# Patient Record
Sex: MALE | Race: BLACK OR AFRICAN AMERICAN | Employment: OTHER | URBAN - METROPOLITAN AREA
[De-identification: names, ages, dates, MRNs, and addresses within clinical notes are randomized per-mention and may not be internally consistent; named-entity substitution may affect disease eponyms.]

---

## 2017-01-13 ENCOUNTER — OFFICE VISIT (OUTPATIENT)
Dept: FAMILY MEDICINE | Facility: CLINIC | Age: 57
End: 2017-01-13
Payer: COMMERCIAL

## 2017-01-13 VITALS
OXYGEN SATURATION: 98 % | SYSTOLIC BLOOD PRESSURE: 112 MMHG | WEIGHT: 185.4 LBS | RESPIRATION RATE: 20 BRPM | DIASTOLIC BLOOD PRESSURE: 64 MMHG | TEMPERATURE: 97 F | HEART RATE: 86 BPM | HEIGHT: 71 IN | BODY MASS INDEX: 25.96 KG/M2

## 2017-01-13 DIAGNOSIS — E78.5 HYPERLIPIDEMIA LDL GOAL <130: ICD-10-CM

## 2017-01-13 DIAGNOSIS — E55.9 VITAMIN D INSUFFICIENCY: ICD-10-CM

## 2017-01-13 DIAGNOSIS — I10 HYPERTENSION GOAL BP (BLOOD PRESSURE) < 140/80: Chronic | ICD-10-CM

## 2017-01-13 DIAGNOSIS — J06.9 VIRAL UPPER RESPIRATORY TRACT INFECTION: Primary | ICD-10-CM

## 2017-01-13 PROCEDURE — 99213 OFFICE O/P EST LOW 20 MIN: CPT | Performed by: FAMILY MEDICINE

## 2017-01-13 RX ORDER — IPRATROPIUM BROMIDE 42 UG/1
2 SPRAY, METERED NASAL 4 TIMES DAILY PRN
Qty: 1 BOX | Refills: 3 | Status: SHIPPED | OUTPATIENT
Start: 2017-01-13 | End: 2018-05-03

## 2017-01-13 RX ORDER — PREDNISONE 10 MG/1
10 TABLET ORAL DAILY
Qty: 7 TABLET | Refills: 0 | Status: SHIPPED | OUTPATIENT
Start: 2017-01-13 | End: 2018-05-03

## 2017-01-13 RX ORDER — ALBUTEROL SULFATE 90 UG/1
2 AEROSOL, METERED RESPIRATORY (INHALATION) EVERY 6 HOURS PRN
Qty: 1 INHALER | Refills: 0 | Status: SHIPPED | OUTPATIENT
Start: 2017-01-13 | End: 2018-05-03

## 2017-01-13 RX ORDER — IPRATROPIUM BROMIDE 42 UG/1
2 SPRAY, METERED NASAL 4 TIMES DAILY PRN
Qty: 3 BOX | Refills: 0 | Status: SHIPPED | OUTPATIENT
Start: 2017-01-13 | End: 2017-01-13

## 2017-01-13 RX ORDER — INDAPAMIDE 2.5 MG/1
2.5 TABLET ORAL DAILY
Qty: 90 TABLET | Refills: 3 | Status: SHIPPED | OUTPATIENT
Start: 2017-01-13 | End: 2018-02-25

## 2017-01-13 NOTE — PATIENT INSTRUCTIONS
(J06.9,  B97.89) Viral upper respiratory tract infection  (primary encounter diagnosis)    Comment:      Plan: ipratropium (ATROVENT) 0.06 % spray, albuterol           (PROAIR HFA/PROVENT    IL HFA/VENTOLIN HFA) 108 (90          BASE) MCG/ACT Inhaler,     predniSONE (DELTASONE)           10 MG tablet, DISCONTINUED: ipratropium           (ATROVENT) 0.06 % spray    Kerwin Fleming Jr., MD
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
## 2017-01-13 NOTE — MR AVS SNAPSHOT
"              After Visit Summary   1/13/2017    Jaren Carmona    MRN: [id number]           Patient Information     Date Of Birth          1960        Visit Information        Provider Department      1/13/2017 1:30 PM Kerwin Fleming MD [facility]        Today's Diagnoses     Viral upper respiratory tract infection    -  1       Care Instructions      (J06.9,  B97.89) Viral upper respiratory tract infection  (primary encounter diagnosis)    Comment:      Plan: ipratropium (ATROVENT) 0.06 % spray, albuterol           (PROAIR HFA/PROVENT    IL HFA/VENTOLIN HFA) 108 (90          BASE) MCG/ACT Inhaler,     predniSONE (DELTASONE)           10 MG tablet, DISCONTINUED: ipratropium           (ATROVENT) 0.06 % spray    Kerwin Fleming Jr., MD                      Follow-ups after your visit        Who to contact     If you have questions or need follow up information about today's clinic visit or your schedule please contact [facility] directly at [phone].  Normal or non-critical lab and imaging results will be communicated to you by Vhotohart, letter or phone within 4 business days after the clinic has received the results. If you do not hear from us within 7 days, please contact the clinic through [employer] or phone. If you have a critical or abnormal lab result, we will notify you by phone as soon as possible.  Submit refill requests through [employer] or call your pharmacy and they will forward the refill request to us. Please allow 3 business days for your refill to be completed.          Additional Information About Your Visit        MyChart Information     [employer] lets you send messages to your doctor, view your test results, renew your prescriptions, schedule appointments and more. To sign up, go to [email or web address] . Click on \"Log in\" on the left side of the screen, which will take you to the Welcome page. Then click on " "\"Sign up Now\" on the right side of the page.     You will be asked to enter the access code listed below, as well as some personal information. Please follow the directions to create your username and password.     Your access code is: [email or web address]  Expires: 3/30/2017  8:32 AM     Your access code will  in 90 days. If you need help or a new code, please call your [facility] clinic or [phone].        Care EveryWhere ID     This is your Care EveryWhere ID. This could be used by other organizations to access your [facility] medical records  [id number]        Your Vitals Were     Pulse Temperature Respirations Height BMI (Body Mass Index) Pulse Oximetry    86 97  F (36.1  C) (Tympanic) 20 5' 10.5\" (1.791 m) 26.22 kg/m2 98%       Blood Pressure from Last 3 Encounters:   17 112/64   16 112/72   16 126/74    Weight from Last 3 Encounters:   17 185 lb 6.4 oz (84.097 kg)   16 185 lb (83.915 kg)   16 181 lb 12.8 oz (82.464 kg)              Today, you had the following     No orders found for display         Today's Medication Changes          These changes are accurate as of: 17  2:06 PM.  If you have any questions, ask your nurse or doctor.               Start taking these medicines.        Dose/Directions    albuterol 108 (90 BASE) MCG/ACT Inhaler   Commonly known as:  PROAIR HFA/PROVENTIL HFA/VENTOLIN HFA   Used for:  Viral upper respiratory tract infection   Started by:  Kerwin Fleming MD        Dose:  2 puff   Inhale 2 puffs into the lungs every 6 hours as needed for shortness of breath / dyspnea or wheezing   Quantity:  1 Inhaler   Refills:  0       ipratropium 0.06 % spray   Commonly known as:  ATROVENT   Used for:  Viral upper respiratory tract infection   Started by:  Kerwin Fleming MD        Dose:  2 spray   Spray 2 sprays into both nostrils 4 times daily as needed for rhinitis   Quantity:  1 Box   Refills:  3       predniSONE 10 MG tablet "   Commonly known as:  DELTASONE   Used for:  Viral upper respiratory tract infection   Started by:  Kerwin Fleming MD        Dose:  10 mg   Take 1 tablet (10 mg) by mouth daily   Quantity:  7 tablet   Refills:  0            Where to get your medicines      These medications were sent to [email or web address] - [place], [place] - [address] AT [facility] [address], [facility] [address]    Hours:  24-hours Phone:  [phone]    - albuterol 108 (90 BASE) MCG/ACT Inhaler  - ipratropium 0.06 % spray  - predniSONE 10 MG tablet             Primary Care Provider Office Phone # Fax #    Kerwin Fleming [phone]       [facility] [address]  [facility] [address]        Thank you!     Thank you for choosing [facility]  for your care. Our goal is always to provide you with excellent care. Hearing back from our patients is one way we can continue to improve our services. Please take a few minutes to complete the written survey that you may receive in the mail after your visit with us. Thank you!             Your Updated Medication List - Protect others around you: Learn how to safely use, store and throw away your medicines at www.disposemymeds.org.          This list is accurate as of: 1/13/17  2:06 PM.  Always use your most recent med list.                   Brand Name Dispense Instructions for use    albuterol 108 (90 BASE) MCG/ACT Inhaler    PROAIR HFA/PROVENTIL HFA/VENTOLIN HFA    1 Inhaler    Inhale 2 puffs into the lungs every 6 hours as needed for shortness of breath / dyspnea or wheezing       ALPRAZolam 0.25 MG tablet    XANAX    20 tablet    Take 1 tablet (0.25 mg) by mouth 3 times daily as needed for anxiety       diclofenac 1 % Gel topical gel    VOLTAREN    100 g    Apply 4 grams to knees or 2 grams to hands four times daily using enclosed dosing card.       fluticasone 50 MCG/ACT spray     FLONASE    48 mL    SHAKE LIQUID AND USE 1 TO 2 SPRAYS IN EACH NOSTRIL DAILY       indapamide 2.5 MG tablet    LOZOL    90 tablet    TAKE 1 TABLET BY MOUTH DAILY       ipratropium 0.06 % spray    ATROVENT    1 Box    Spray 2 sprays into both nostrils 4 times daily as needed for rhinitis       olopatadine HCl 0.2 % Soln    PATADAY    1 Bottle    Place 1 drop into both eyes as needed       predniSONE 10 MG tablet    DELTASONE    7 tablet    Take 1 tablet (10 mg) by mouth daily       sildenafil 20 MG tablet    REVATIO/VIAGRA    100 tablet    Take 1 tablet (20 mg) by mouth 3 times daily For pulmonary hypertension.  Never use with nitroglycerin, terazosin or doxazosin.

## 2017-01-13 NOTE — NURSING NOTE
"Chief Complaint   Patient presents with     Cough       Initial /64 mmHg  Pulse 86  Temp(Src) 97  F (36.1  C) (Tympanic)  Resp 20  Ht 5' 10.5\" (1.791 m)  Wt 185 lb 6.4 oz (84.097 kg)  BMI 26.22 kg/m2  SpO2 98% Estimated body mass index is 26.22 kg/(m^2) as calculated from the following:    Height as of this encounter: 5' 10.5\" (1.791 m).    Weight as of this encounter: 185 lb 6.4 oz (84.097 kg).  BP completed using cuff size: gogo Kenyon CMA      "

## 2017-06-29 ENCOUNTER — TELEPHONE (OUTPATIENT)
Dept: FAMILY MEDICINE | Facility: CLINIC | Age: 57
End: 2017-06-29

## 2017-06-29 NOTE — LETTER
[facility]  [address]  [address]  [facility] [place] [id number] [phone]                                                                                                           Jaren Carmona  [address]  [place] [place] [address]    June 29, 2017      Dear Mr. Mahmoodcr,     It has come to my attention that you have not had a  FIT test kit (testing for hidden blood in the stool) completed since 2013.     I care about your health. Colon cancer is on the rise and the FIT test for blood in the stool is the best test to detect this. This takes only one stool specimen and no diet restrictions prior to collection. This is an annual test and kit can be obtained at the lab department. Thank you.     If you have any questions, please feel free to contact us at  [phone]        Sincerely,    Kerwin Fleming Jr MD

## 2017-06-29 NOTE — TELEPHONE ENCOUNTER
Panel Management Review      Patient has the following on his problem list:     Hypertension   Last three blood pressure readings:  BP Readings from Last 3 Encounters:   01/13/17 112/64   12/30/16 112/72   07/26/16 126/74     Blood pressure: Passed    HTN Guidelines:  Age 18-59 BP range:  Less than 140/90  Age 60-85 with Diabetes:  Less than 140/90  Age 60-85 without Diabetes:  less than 150/90      Composite cancer screening  Chart review shows that this patient is due/due soon for the following Fecal Colorectal (FIT)  Summary:    Patient is due/failing the following:   FIT    Action needed:   Needs to  FIT    Type of outreach:    Sent letter.    Questions for provider review:    None                                                                                                                                    Yessenia Kenyon CMA

## 2017-12-07 ENCOUNTER — TELEPHONE (OUTPATIENT)
Dept: FAMILY MEDICINE | Facility: CLINIC | Age: 57
End: 2017-12-07

## 2017-12-07 NOTE — TELEPHONE ENCOUNTER
12/7/2017      Patient is aware of preventative health screenings and will call back on their own time to schedule.              Outreach ,  Sofía Manrique

## 2018-02-25 DIAGNOSIS — I10 HYPERTENSION GOAL BP (BLOOD PRESSURE) < 140/80: Chronic | ICD-10-CM

## 2018-02-27 NOTE — TELEPHONE ENCOUNTER
"Requested Prescriptions   Pending Prescriptions Disp Refills     indapamide (LOZOL) 2.5 MG tablet [Pharmacy Med Name: INDAPAMIDE 2.5MG TABLETS] 90 tablet 0    Last Written Prescription Date:  1/13/17  Last Fill Quantity: 90,  # refills: 3   Last office visit: 1/13/2017 with prescribing provider:     Future Office Visit:       Sig: TAKE 1 TABLET(2.5 MG) BY MOUTH DAILY    Diuretics (Including Combos) Protocol Failed    2/25/2018  3:14 PM       Failed - Recent or future visit with authorizing provider's specialty    Patient had office visit in the last year or has a visit in the next 30 days with authorizing provider.  See \"Patient Info\" tab in inbasket, or \"Choose Columns\" in Meds & Orders section of the refill encounter.            Failed - Normal serum creatinine on file in past 12 months    Recent Labs   Lab Test  12/08/15   1601   CR  1.30*             Failed - Normal serum potassium on file in past 12 months    Recent Labs   Lab Test  12/08/15   1601   POTASSIUM  3.8                   Failed - Normal serum sodium on file in past 12 months    Recent Labs   Lab Test  12/08/15   1601   NA  141             Passed - Blood pressure under 140/90 in past 12 months    BP Readings from Last 3 Encounters:   01/13/17 112/64   12/30/16 112/72   07/26/16 126/74                Passed - Patient is age 18 or older          "

## 2018-02-27 NOTE — TELEPHONE ENCOUNTER
Routing refill request to provider for review/approval because:  Patient needs to be seen because it has been more than 1 year since last office visit. [place]  He will call for an appointment when he returns.  Patient states he has enough medication for now.  Will refuse this request.

## 2018-03-02 RX ORDER — INDAPAMIDE 2.5 MG/1
TABLET ORAL
Qty: 30 TABLET | Refills: 0 | Status: SHIPPED | OUTPATIENT
Start: 2018-03-02 | End: 2018-05-03

## 2018-05-03 ENCOUNTER — OFFICE VISIT (OUTPATIENT)
Dept: FAMILY MEDICINE | Facility: CLINIC | Age: 58
End: 2018-05-03

## 2018-05-03 VITALS
BODY MASS INDEX: 23.62 KG/M2 | DIASTOLIC BLOOD PRESSURE: 74 MMHG | TEMPERATURE: 97.7 F | RESPIRATION RATE: 16 BRPM | HEART RATE: 90 BPM | OXYGEN SATURATION: 97 % | HEIGHT: 70 IN | SYSTOLIC BLOOD PRESSURE: 118 MMHG | WEIGHT: 165 LBS

## 2018-05-03 DIAGNOSIS — Z11.59 NEED FOR HEPATITIS C SCREENING TEST: ICD-10-CM

## 2018-05-03 DIAGNOSIS — R01.1 SYSTOLIC MURMUR: ICD-10-CM

## 2018-05-03 DIAGNOSIS — E78.5 HYPERLIPIDEMIA LDL GOAL <130: ICD-10-CM

## 2018-05-03 DIAGNOSIS — I10 HYPERTENSION GOAL BP (BLOOD PRESSURE) < 140/80: Chronic | ICD-10-CM

## 2018-05-03 DIAGNOSIS — J06.9 VIRAL UPPER RESPIRATORY TRACT INFECTION: ICD-10-CM

## 2018-05-03 DIAGNOSIS — Z12.11 SCREEN FOR COLON CANCER: ICD-10-CM

## 2018-05-03 DIAGNOSIS — M54.2 NECK PAIN: ICD-10-CM

## 2018-05-03 DIAGNOSIS — J30.2 CHRONIC SEASONAL ALLERGIC RHINITIS, UNSPECIFIED TRIGGER: ICD-10-CM

## 2018-05-03 DIAGNOSIS — Z00.00 ROUTINE GENERAL MEDICAL EXAMINATION AT A HEALTH CARE FACILITY: Primary | ICD-10-CM

## 2018-05-03 DIAGNOSIS — G89.29 CHRONIC MIDLINE LOW BACK PAIN WITHOUT SCIATICA: ICD-10-CM

## 2018-05-03 DIAGNOSIS — S19.9XXD NECK INJURY, SUBSEQUENT ENCOUNTER: ICD-10-CM

## 2018-05-03 DIAGNOSIS — H10.10 SEASONAL ALLERGIC CONJUNCTIVITIS: ICD-10-CM

## 2018-05-03 DIAGNOSIS — M54.50 CHRONIC MIDLINE LOW BACK PAIN WITHOUT SCIATICA: ICD-10-CM

## 2018-05-03 DIAGNOSIS — R37 SEXUAL DYSFUNCTION: ICD-10-CM

## 2018-05-03 DIAGNOSIS — E55.9 VITAMIN D INSUFFICIENCY: ICD-10-CM

## 2018-05-03 DIAGNOSIS — Z12.5 SPECIAL SCREENING FOR MALIGNANT NEOPLASM OF PROSTATE: ICD-10-CM

## 2018-05-03 PROCEDURE — 99396 PREV VISIT EST AGE 40-64: CPT | Performed by: FAMILY MEDICINE

## 2018-05-03 RX ORDER — SILDENAFIL CITRATE 20 MG/1
20 TABLET ORAL 3 TIMES DAILY
Qty: 100 TABLET | Refills: 11 | Status: SHIPPED | OUTPATIENT
Start: 2018-05-03 | End: 2019-11-25

## 2018-05-03 RX ORDER — INDAPAMIDE 2.5 MG/1
2.5 TABLET ORAL DAILY
Qty: 90 TABLET | Refills: 3 | Status: SHIPPED | OUTPATIENT
Start: 2018-05-03 | End: 2018-07-30

## 2018-05-03 RX ORDER — FLUTICASONE PROPIONATE 50 MCG
SPRAY, SUSPENSION (ML) NASAL
Qty: 48 ML | Refills: 3 | Status: SHIPPED | OUTPATIENT
Start: 2018-05-03 | End: 2020-04-17

## 2018-05-03 RX ORDER — PREDNISONE 10 MG/1
10 TABLET ORAL DAILY
Qty: 7 TABLET | Refills: 0 | Status: SHIPPED | OUTPATIENT
Start: 2018-05-03 | End: 2019-11-25

## 2018-05-03 RX ORDER — ALBUTEROL SULFATE 90 UG/1
2 AEROSOL, METERED RESPIRATORY (INHALATION) EVERY 6 HOURS PRN
Qty: 1 INHALER | Refills: 0 | Status: SHIPPED | OUTPATIENT
Start: 2018-05-03 | End: 2020-04-17

## 2018-05-03 RX ORDER — IPRATROPIUM BROMIDE 42 UG/1
2 SPRAY, METERED NASAL 4 TIMES DAILY PRN
Qty: 1 BOX | Refills: 3 | Status: SHIPPED | OUTPATIENT
Start: 2018-05-03 | End: 2020-04-17

## 2018-05-03 NOTE — Clinical Note
Please abstract the following data from this visit with this patient into the appropriate field in Epic:  Colonoscopy done on this date: 1/2016 (approximately), by this group: [facility], results were normal.
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
## 2018-05-03 NOTE — PATIENT INSTRUCTIONS
(Z00.00) Routine general medical examination at a health care facility  (primary encounter diagnosis)  Comment:    Plan:      (Z12.11) Screen for colon cancer  Comment:    Plan:      (Z11.59) Need for hepatitis C screening test  Comment:    Plan:      (J06.9,  B97.89) Viral upper respiratory tract infection  Comment:    Plan: albuterol (PROAIR HFA/PROVENTIL HFA/VENTOLIN         HFA) 108 (90 Base) MCG/ACT Inhaler, predniSONE         (DELTASONE) 10 MG tablet, ipratropium         (ATROVENT) 0.06 % spray                  (J30.2) Chronic seasonal allergic rhinitis, unspecified trigger  Comment:    Plan: fluticasone (FLONASE) 50 MCG/ACT spray             (S19.9XXD) Neck injury, subsequent encounter  Comment:    Plan:      (R37) Sexual dysfunction  Comment:    Plan: sildenafil (REVATIO) 20 MG tablet             (H10.45) Seasonal allergic conjunctivitis  Comment:    Plan:      (I10) Hypertension goal BP (blood pressure) < 140/80  Comment:    Plan: indapamide (LOZOL) 2.5 MG tablet, Basic         metabolic panel             (M54.5,  G89.29) Chronic midline low back pain without sciatica  Comment:    Plan:      (M54.2) Neck pain  Comment:    Plan:      (E55.9) Vitamin D insufficiency  Comment:    Plan:      (R01.1) Systolic murmur  Comment:    Plan:      (E78.5) Hyperlipidemia LDL goal <130  Comment:    Plan:      (Z12.5) Special screening for malignant neoplasm of prostate  Comment:    Plan: Prostate spec antigen screen

## 2018-05-03 NOTE — MR AVS SNAPSHOT
After Visit Summary   5/3/2018    Jaren Carmona    MRN: [id number]           Patient Information     Date Of Birth          1960        Visit Information        Provider Department      5/3/2018 3:00 PM Kerwin Fleming MD [facility]        Today's Diagnoses     Routine general medical examination at a health care facility    -  1    Screen for colon cancer        Need for hepatitis C screening test        Viral upper respiratory tract infection        Chronic seasonal allergic rhinitis, unspecified trigger        Neck injury, subsequent encounter        Sexual dysfunction        Seasonal allergic conjunctivitis        Hypertension goal BP (blood pressure) < 140/80        Chronic midline low back pain without sciatica        Neck pain        Vitamin D insufficiency        Systolic murmur        Hyperlipidemia LDL goal <130        Special screening for malignant neoplasm of prostate          Care Instructions    (Z00.00) Routine general medical examination at a health care facility  (primary encounter diagnosis)  Comment:    Plan:      (Z12.11) Screen for colon cancer  Comment:    Plan:      (Z11.59) Need for hepatitis C screening test  Comment:    Plan:      (J06.9,  B97.89) Viral upper respiratory tract infection  Comment:    Plan: albuterol (PROAIR HFA/PROVENTIL HFA/VENTOLIN         HFA) 108 (90 Base) MCG/ACT Inhaler, predniSONE         (DELTASONE) 10 MG tablet, ipratropium         (ATROVENT) 0.06 % spray                  (J30.2) Chronic seasonal allergic rhinitis, unspecified trigger  Comment:    Plan: fluticasone (FLONASE) 50 MCG/ACT spray             (S19.9XXD) Neck injury, subsequent encounter  Comment:    Plan:      (R37) Sexual dysfunction  Comment:    Plan: sildenafil (REVATIO) 20 MG tablet             (H10.45) Seasonal allergic conjunctivitis  Comment:    Plan:      (I10) Hypertension goal BP (blood pressure) < 140/80  Comment:    Plan:  "indapamide (LOZOL) 2.5 MG tablet, Basic         metabolic panel             (M54.5,  G89.29) Chronic midline low back pain without sciatica  Comment:    Plan:      (M54.2) Neck pain  Comment:    Plan:      (E55.9) Vitamin D insufficiency  Comment:    Plan:      (R01.1) Systolic murmur  Comment:    Plan:      (E78.5) Hyperlipidemia LDL goal <130  Comment:    Plan:      (Z12.5) Special screening for malignant neoplasm of prostate  Comment:    Plan: Prostate spec antigen screen                       Follow-ups after your visit        Future tests that were ordered for you today     Open Standing Orders        Priority Remaining Interval Expires Ordered    Basic metabolic panel Routine 3/3 6-12 months 5/3/2018 5/3/2018    Prostate spec antigen screen Routine 3/3 6-12 months 5/3/2018 5/3/2018            Who to contact     If you have questions or need follow up information about today's clinic visit or your schedule please contact [facility] directly at [phone].  Normal or non-critical lab and imaging results will be communicated to you by Sarnovahart, letter or phone within 4 business days after the clinic has received the results. If you do not hear from us within 7 days, please contact the clinic through Sarnovahart or phone. If you have a critical or abnormal lab result, we will notify you by phone as soon as possible.  Submit refill requests through [employer] or call your pharmacy and they will forward the refill request to us. Please allow 3 business days for your refill to be completed.          Additional Information About Your Visit        [employer] Information     [employer] lets you send messages to your doctor, view your test results, renew your prescriptions, schedule appointments and more. To sign up, go to [email or web address] . Click on \"Log in\" on the left side of the screen, which will take you to the Welcome page. Then click on \"Sign up Now\" on the right side of the page. " "    You will be asked to enter the access code listed below, as well as some personal information. Please follow the directions to create your username and password.     Your access code is: [email or web address]  Expires: 2018  4:01 PM     Your access code will  in 90 days. If you need help or a new code, please call your [facility] clinic or [phone].        Care EveryWhere ID     This is your Care EveryWhere ID. This could be used by other organizations to access your [facility] medical records  [id number]        Your Vitals Were     Pulse Temperature Respirations Height Pulse Oximetry BMI (Body Mass Index)    90 97.7  F (36.5  C) (Tympanic) 16 5' 10\" (1.778 m) 97% 23.68 kg/m2       Blood Pressure from Last 3 Encounters:   18 118/74   17 112/64   16 112/72    Weight from Last 3 Encounters:   18 165 lb (74.8 kg)   17 185 lb 6.4 oz (84.1 kg)   16 185 lb (83.9 kg)                 Today's Medication Changes          These changes are accurate as of 5/3/18  4:01 PM.  If you have any questions, ask your nurse or doctor.               These medicines have changed or have updated prescriptions.        Dose/Directions    fluticasone 50 MCG/ACT spray   Commonly known as:  FLONASE   This may have changed:  See the new instructions.   Used for:  Chronic seasonal allergic rhinitis, unspecified trigger   Changed by:  Kerwin Fleming MD        SHAKE LIQUID AND USE 1 TO 2 SPRAYS IN EACH NOSTRIL DAILY   Quantity:  48 mL   Refills:  3       indapamide 2.5 MG tablet   Commonly known as:  LOZOL   This may have changed:  See the new instructions.   Used for:  Hypertension goal BP (blood pressure) < 140/80   Changed by:  Kerwin Fleming MD        Dose:  2.5 mg   Take 1 tablet (2.5 mg) by mouth daily   Quantity:  90 tablet   Refills:  3            Where to get your medicines      These medications were sent to [facility] [address] [place] [address] [facility] [address] " AT [facility] [place] [facility] [place]  [address], [facility] [address]     Phone:  [phone]     predniSONE 10 MG tablet         Some of these will need a paper prescription and others can be bought over the counter.  Ask your nurse if you have questions.     Bring a paper prescription for each of these medications     albuterol 108 (90 Base) MCG/ACT Inhaler    fluticasone 50 MCG/ACT spray    indapamide 2.5 MG tablet    ipratropium 0.06 % spray    sildenafil 20 MG tablet                Primary Care Provider Office Phone # Fax #    Kerwin Mary Lou Fleming [phone] [address]  [facility] [id number]        Equal Access to Services     CLAY GARCIA : Hadii aad ku hadasho Sohangali, waaxda luqadaha, qaybta kaalmada adeegyada, waxay aman montano. So [facility] [phone].    ATENCIÓN: Si habla español, tiene a reyes disposición servicios gratuitos de asistencia lingüística. [facility] [phone].    We comply with applicable federal civil rights laws and [place] laws. We do not discriminate on the basis of race, color, national origin, age, disability, sex, sexual orientation, or gender identity.            Thank you!     Thank you for choosing [facility]  for your care. Our goal is always to provide you with excellent care. Hearing back from our patients is one way we can continue to improve our services. Please take a few minutes to complete the written survey that you may receive in the mail after your visit with us. Thank you!             Your Updated Medication List - Protect others around you: Learn how to safely use, store and throw away your medicines at www.disposemymeds.org.          This list is accurate as of 5/3/18  4:01 PM.  Always use your most recent med list.                   Brand Name Dispense Instructions for use Diagnosis    albuterol 108 (90 Base) MCG/ACT Inhaler    PROAIR HFA/PROVENTIL HFA/VENTOLIN  HFA    1 Inhaler    Inhale 2 puffs into the lungs every 6 hours as needed for shortness of breath / dyspnea or wheezing    Viral upper respiratory tract infection       diclofenac 1 % Gel topical gel    VOLTAREN    100 g    Apply 4 grams to knees or 2 grams to hands four times daily using enclosed dosing card.    Neck injury, subsequent encounter       fluticasone 50 MCG/ACT spray    FLONASE    48 mL    SHAKE LIQUID AND USE 1 TO 2 SPRAYS IN EACH NOSTRIL DAILY    Chronic seasonal allergic rhinitis, unspecified trigger       indapamide 2.5 MG tablet    LOZOL    90 tablet    Take 1 tablet (2.5 mg) by mouth daily    Hypertension goal BP (blood pressure) < 140/80       ipratropium 0.06 % spray    ATROVENT    1 Box    Spray 2 sprays into both nostrils 4 times daily as needed for rhinitis    Viral upper respiratory tract infection       olopatadine HCl 0.2 % Soln    PATADAY    1 Bottle    Place 1 drop into both eyes as needed    Seasonal allergic conjunctivitis       predniSONE 10 MG tablet    DELTASONE    7 tablet    Take 1 tablet (10 mg) by mouth daily    Viral upper respiratory tract infection       sildenafil 20 MG tablet    REVATIO    100 tablet    Take 1 tablet (20 mg) by mouth 3 times daily For pulmonary hypertension.  Never use with nitroglycerin, terazosin or doxazosin.    Sexual dysfunction

## 2018-07-16 ENCOUNTER — HOSPITAL ENCOUNTER (EMERGENCY)
Facility: CLINIC | Age: 58
Discharge: HOME | End: 2018-07-17
Attending: EMERGENCY MEDICINE | Admitting: EMERGENCY MEDICINE
Payer: COMMERCIAL

## 2018-07-16 ENCOUNTER — APPOINTMENT (OUTPATIENT)
Dept: CT IMAGING | Facility: CLINIC | Age: 58
End: 2018-07-16
Attending: EMERGENCY MEDICINE
Payer: COMMERCIAL

## 2018-07-16 DIAGNOSIS — S16.1XXA STRAIN OF NECK MUSCLE, INITIAL ENCOUNTER: ICD-10-CM

## 2018-07-16 DIAGNOSIS — S09.90XA CLOSED HEAD INJURY, INITIAL ENCOUNTER: ICD-10-CM

## 2018-07-16 PROCEDURE — 99284 EMERGENCY DEPT VISIT MOD MDM: CPT | Mod: 25

## 2018-07-16 PROCEDURE — 70450 CT HEAD/BRAIN W/O DYE: CPT

## 2018-07-16 NOTE — ED AVS SNAPSHOT
Emergency Department    [address] [place] [address]    Phone:  [phone]    Fax:  [phone]                                       Jaren Carmona   MRN: [id number]    Department:   Emergency Department   Date of Visit:  7/16/2018           After Visit Summary Signature Page     I have received my discharge instructions, and my questions have been answered. I have discussed any challenges I see with this plan with the nurse or doctor.    ..........................................................................................................................................  Patient/Patient Representative Signature      ..........................................................................................................................................  Patient Representative Print Name and Relationship to Patient    ..................................................               ................................................  Date                                            Time    ..........................................................................................................................................  Reviewed by Signature/Title    ...................................................              ..............................................  Date                                                            Time
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
## 2018-07-17 ENCOUNTER — APPOINTMENT (OUTPATIENT)
Dept: GENERAL RADIOLOGY | Facility: CLINIC | Age: 58
End: 2018-07-17
Attending: EMERGENCY MEDICINE
Payer: COMMERCIAL

## 2018-07-17 VITALS
OXYGEN SATURATION: 99 % | HEART RATE: 73 BPM | DIASTOLIC BLOOD PRESSURE: 84 MMHG | RESPIRATION RATE: 18 BRPM | HEIGHT: 72 IN | WEIGHT: 174 LBS | TEMPERATURE: 98.2 F | BODY MASS INDEX: 23.57 KG/M2 | SYSTOLIC BLOOD PRESSURE: 162 MMHG

## 2018-07-17 PROCEDURE — 72040 X-RAY EXAM NECK SPINE 2-3 VW: CPT

## 2018-07-17 RX ORDER — CYCLOBENZAPRINE HCL 10 MG
10 TABLET ORAL 3 TIMES DAILY PRN
Qty: 15 TABLET | Refills: 0 | Status: SHIPPED | OUTPATIENT
Start: 2018-07-17 | End: 2018-07-23

## 2018-07-17 ASSESSMENT — ENCOUNTER SYMPTOMS
WEAKNESS: 0
NECK PAIN: 1
HEADACHES: 1
NUMBNESS: 0
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
## 2018-07-17 NOTE — DISCHARGE INSTRUCTIONS
Discharge Instructions  Head Injury    You have been seen today for a head injury. You were checked for serious problems, like bleeding on the brain, but these problems cannot always be found right away.  Due to this risk, you should not be alone for 24 hours after your injury.  Follow up with your regular physician in 4-5 days. If you are taking a blood thinner, such as aspirin, Pradaxa  (dabigatran), Coumadin  (warfarin), or Plavix  (clopidogrel), you are at especially high risk for immediate or delayed bleeding, and need to re-check with a physician in 24 hours, or sooner if any of the symptoms below happen.     Return to the Emergency Department if:    You are confused, have amnesia, or you are not acting right.    Your headache gets worse or you start to have a really bad headache even with your recommended treatment plan.    You vomit more than once.    You have a convulsion or seizure.    You have trouble walking.    You have weakness or paralysis in an arm or a leg.    You have blood or fluid coming from your ears or nose.    You have new symptoms or anything that worries you.    Sleeping:  It is okay for you to sleep, but someone should wake you up as instructed by your doctor, and someone should check on you at your usual time to wake up.     Activity:    Do not drive for at least 24 hours.    Do not drive if you have dizzy spells or trouble concentrating, or remembering things.    Do not return to any contact sports until cleared by your regular doctor.     Follow-up:  It is very important that you make an appointment with your clinic and go to the appointment.  If you do not follow-up with your regular doctor, it may result in missing an important development which could result in permanent injury or disability and/or lasting pain.  If there is any problem keeping your appointment, call your doctor or return to the Emergency Department.    MORE INFORMATION:    Concussion:  A concussion is a minor head  injury that may cause temporary problems with the way your brain works.  Some symptoms include:  confusion, amnesia, nausea and vomiting, dizziness, fatigue, memory or concentration problems, irritability and sleep problems.    CT Scans: Your evaluation today may have included a CT scan (CAT scan) to look for things like bleeding or a skull fracture (break).  CT scans involve radiation and too many CT scans can cause serious health problems like cancer, especially in children.  Because of this, your doctor may not have ordered a CT scan today if they think you are at low risk for a serious or life threatening problem.    If you were given a prescription for medicine here today, be sure to read all of the information (including the package insert) that comes with your prescription.  This will include important information about the medicine, its side effects, and any warnings that you need to know about.  The pharmacist who fills the prescription can provide more information and answer questions you may have about the medicine.  If you have questions or concerns that the pharmacist cannot address, please call or return to the Emergency Department.     Opioid Medication Information    Pain medications are among the most commonly prescribed medicines, so we are including this information for all our patients. If you did not receive pain medication or get a prescription for pain medicine, you can ignore it.     You may have been given a prescription for an opioid (narcotic) pain medicine and/or have received a pain medicine while here in the Emergency Department. These medicines can make you drowsy or impaired. You must not drive, operate dangerous equipment, or engage in any other dangerous activities while taking these medications. If you drive while taking these medications, you could be arrested for DUI, or driving under the influence. Do not drink any alcohol while you are taking these medications.     Opioid pain  medications can cause addiction. If you have a history of chemical dependency of any type, you are at a higher risk of becoming addicted to pain medications.  Only take these prescribed medications to treat your pain when all other options have been tried. Take it for as short a time and as few doses as possible. Store your pain pills in a secure place, as they are frequently stolen and provide a dangerous opportunity for children or visitors in your house to start abusing these powerful medications. We will not replace any lost or stolen medicine.  As soon as your pain is better, you should flush all your remaining medication.     Many prescription pain medications contain Tylenol  (acetaminophen), including Vicodin , Tylenol #3 , Norco , Lortab , and Percocet .  You should not take any extra pills of Tylenol  if you are using these prescription medications or you can get very sick.  Do not ever take more than 3000 mg of acetaminophen in any 24 hour period.    All opioids tend to cause constipation. Drink plenty of water and eat foods that have a lot of fiber, such as fruits, vegetables, prune juice, apple juice and high fiber cereal.  Take a laxative if you don t move your bowels at least every other day. Miralax , Milk of Magnesia, Colace , or Senna  can be used to keep you regular.      Remember that you can always come back to the Emergency Department if you are not able to see your regular doctor in the amount of time listed above, if you get any new symptoms, or if there is anything that worries you.            Understanding Cervical Strain    There are 7 bones (vertebrae) in the neck that are part of the spine. These are called the cervical spine. Cervical strain is a medical term for neck pain. The neck has several layers of muscles. These are connected with tendons to the cervical spine and other bones. Neck pain is often the result of injury to these muscles and tendons.  Causes of cervical  strain  Different types of stress on the neck can damage muscles and tendons (soft tissues) and cause cervical strain. Cervical tissues can be damaged by:    The neck being forced past its normal range of motion, such as in a car accident or sports injury    Constant, low-level stress, such as from poor posture or a poorly set-up workspace  Symptoms of cervical strain  These may include:    Neck pain or stiffness    Pain in the shoulders or upper back    Muscle spasms    Headache, often starting at the base of the neck    Irritability, difficulty concentrating, or sleeplessness  Treatment for cervical strain  This problem often gets better on its own. Treatments aim to reduce pain and inflammation and increase the range of motion of the neck. Possible treatments include:    Over-the-counter or prescription pain medicine. These help relieve pain and inflammation.    Stretching exercises to decrease neck stiffness.    Massage to decrease neck stiffness.    Cold or heat pack. These help reduce pain and swelling.  Call 911  Call 911 right away if you have any of these:    Face drooping or numbness    Numbness or weakness, especially in the arms or on one side    Slurred speech or difficulty speaking    Blurred vision   When to call your healthcare provider  Call your healthcare provider right away if you have any of these:    Fever of 100.4 F (38 C) or higher, or as directed    Pain or stiffness that gets worse    Symptoms that don t get better, or get worse    Numbness, tingling, weakness or shooting pains into the arms or legs    New symptoms  Date Last Reviewed: 3/10/2016    [id number] [email or web address]. [address] [place] [address], [place] [id number]. All rights reserved. This information is not intended as a substitute for professional medical care. Always follow your healthcare professional's instructions.

## 2018-07-30 ENCOUNTER — RADIANT APPOINTMENT (OUTPATIENT)
Dept: GENERAL RADIOLOGY | Facility: CLINIC | Age: 58
End: 2018-07-30
Attending: FAMILY MEDICINE

## 2018-07-30 ENCOUNTER — TELEPHONE (OUTPATIENT)
Dept: FAMILY MEDICINE | Facility: CLINIC | Age: 58
End: 2018-07-30

## 2018-07-30 ENCOUNTER — OFFICE VISIT (OUTPATIENT)
Dept: FAMILY MEDICINE | Facility: CLINIC | Age: 58
End: 2018-07-30

## 2018-07-30 VITALS
SYSTOLIC BLOOD PRESSURE: 146 MMHG | DIASTOLIC BLOOD PRESSURE: 88 MMHG | RESPIRATION RATE: 16 BRPM | BODY MASS INDEX: 23.6 KG/M2 | WEIGHT: 174 LBS | HEART RATE: 66 BPM | OXYGEN SATURATION: 98 % | TEMPERATURE: 96.5 F

## 2018-07-30 DIAGNOSIS — S39.92XD INJURY OF LOW BACK, SUBSEQUENT ENCOUNTER: ICD-10-CM

## 2018-07-30 DIAGNOSIS — I10 HYPERTENSION GOAL BP (BLOOD PRESSURE) < 140/80: Chronic | ICD-10-CM

## 2018-07-30 DIAGNOSIS — S19.9XXS NECK INJURY, SEQUELA: ICD-10-CM

## 2018-07-30 DIAGNOSIS — S49.92XD SHOULDER INJURY, LEFT, SUBSEQUENT ENCOUNTER: Primary | ICD-10-CM

## 2018-07-30 DIAGNOSIS — M54.50 CHRONIC MIDLINE LOW BACK PAIN WITHOUT SCIATICA: ICD-10-CM

## 2018-07-30 DIAGNOSIS — G89.29 CHRONIC MIDLINE LOW BACK PAIN WITHOUT SCIATICA: ICD-10-CM

## 2018-07-30 DIAGNOSIS — I10 HYPERTENSION GOAL BP (BLOOD PRESSURE) < 140/80: Primary | Chronic | ICD-10-CM

## 2018-07-30 DIAGNOSIS — M54.2 NECK PAIN: ICD-10-CM

## 2018-07-30 PROCEDURE — 72100 X-RAY EXAM L-S SPINE 2/3 VWS: CPT | Mod: FY

## 2018-07-30 PROCEDURE — 99214 OFFICE O/P EST MOD 30 MIN: CPT | Performed by: FAMILY MEDICINE

## 2018-07-30 RX ORDER — INDAPAMIDE 1.25 MG/1
1.2 TABLET ORAL DAILY
Qty: 90 TABLET | Refills: 3 | Status: SHIPPED | OUTPATIENT
Start: 2018-07-30 | End: 2018-07-30

## 2018-07-30 RX ORDER — INDAPAMIDE 1.25 MG/1
1.2 TABLET ORAL DAILY
Qty: 90 TABLET | Refills: 3 | Status: SHIPPED | OUTPATIENT
Start: 2018-07-30 | End: 2018-08-01

## 2018-07-30 NOTE — PATIENT INSTRUCTIONS
RIGHT AND LEFT ROTATION INTO PAIN SITTING UP 30 TIMES TWICE DAILY    RIGHT AND LEFT LATERAL BENDING INTO PAIN 30 TIMES TWICE DAILY    NECK EXTENSION 30 TIME TWICE DAILY    NECK FLEXION  30 TIMES TWICE DAILY    MASSAGE BACK OF NECK MULTIPLE TIMES THROUGHOUT DAY AS TOLERATED    PRONE POSITION NECK ROTATION RIGHT AND LEFT 30 TIMES TWICE DAILY    NECK EXTENSION 30 TIMES TWICE DAILY    ROTATE NECK IN [place] RIGHTWARD AND LEFT BEY    AVOID ANY EXERCISE WHICH RESULTS IN SHOOTING NERVE PAIN DOWN THE ARM OR SHOULDER BLADE    Assessment: Lower back pain    Plan: do these exercises at least twice daily:  Standing or sitting exercise can be done every two hours    Beau' Flexion Versus Amalia   Extension Exercises For   Low Back Pain   Examples of Beua' Flexion Exercises  1. Pelvic tilt.  Please press the small of your back against the floor.  Start with 5-10  and increase to 100 count over one month   2. Single Knee to chest. Lie on your back with legs in bent position. Alternate one leg and the other very slowly bringing the knee to chest.  Start with a count of 5-10   Over one month work up to 100  3. Double knee to chest.  Lie on your back with knees in bent position.  Bring both knees to the chest slowly hold for a count of 5-to 10. Over one month work to 100  4. Partial sit-up or crunch.  Lie on your back in bent leg position.  Please bring your body with arms crossed in front  To 30 degrees of flexion. Start with 5-10 over one month work up to 100 or more  5. Sit back.  Please sit on the side of the bed or a stair landing  And lie backwards until the abdominal muscles start to quiver.  Hold for a count of 5-10 and over a month work up to 100.  5. Hamstring stretch.  Please extend your legs while sitting on the floor as tolerated for 5-10 count.  Gradually increase to count of 30 over one month      Alternately find a stair landing or sturdy chair and place heel  In a comfortable level of extension  And  stretch one hamstring at a time for 5-10 seconds.  Increase to count of 30 over one month                         Squat.  Stand with legs comfortably apart and lower the body slowly by flexing the knees  for count of 5-10 over one month increase to 30.  Useful for anterior disc protrusion, facette joint arthritis spond-10ylolysis, spondylolisthesis and spinal stenosis    Katelyn method or extension exercises  Useful disc bulging posteriorly  1. Prone pressups  Please lie on your abdomen.   Please do a push with the upper half of your body only.  This should not cause the pain to shoot down your buttock thigh leg or foot  Start with 10 and repeat x 3 one minute apart.  Repeat x 10 every 1-2 hours  Pain tends to increase in the center of back  And leaves buttock thighs legs and foot over time  You may shift your pelvis in opposite direction of buttock and leg pain to achieve even better results  2. Superman with arms extended  Or at the side Simultaneously lift your arms and legs off the floor. Start with 5-10 over one month increase to 100.  3. Cat stretch or cobra Start  As if in extended position of prone pressup but hold the stretch for 5 or 10 count. Over one moth to count of 30.  4.  Extensions can be done in standing position  As well if prone pressup is inconvenient.  Shift your pelvis in opposite direction of limb pain.  Put your hands  Flat on your buttocks and lean backwards without loss of balance.  Count 10 x 3 times then 10 extensions hourly
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
## 2018-07-30 NOTE — LETTER
July 31, 2018      Jaren Carmona  [address]  [place] [id number]        Dear ,    We are writing to inform you of your test results.    Resulted Orders   XR Lumbar Spine 2/3 Views    Narrative    LUMBAR SPINE TWO TO THREE VIEWS  7/30/2018 3:36 PM     COMPARISON: None.    HISTORY:  Chronic midline low back pain without sciatica. Injury of  low back, subsequent encounter.      Impression    IMPRESSION: There is normal alignment of the lumbar vertebrae.  Vertebral body heights of the lumbar spine are normal. There is no  evidence for fracture of the lumbar spine. There is degenerative  endplate spurring at the L2-L3, L3-L4 and L4-L5 levels. There is facet  arthropathy to varying degrees at all levels of the lumbar spine.    DELISA WEST MD       If you have any questions or concerns, please call the clinic at the number listed above.       Sincerely,        YUKI JENNINGS MD

## 2018-07-30 NOTE — MR AVS SNAPSHOT
After Visit Summary   7/30/2018    Jaren Carmona    MRN: [id number]           Patient Information     Date Of Birth          1960        Visit Information        Provider Department      7/30/2018 2:15 PM Kerwin Fleming MD [facility]        Today's Diagnoses     Shoulder injury, left, subsequent encounter    -  1    Neck injury, sequela        Injury of low back, subsequent encounter        Chronic midline low back pain without sciatica        Neck pain          Care Instructions      Exercises to Strengthen Your Lower Back  Strong lower back and abdominal muscles work together to support your spine. The exercises below will help strengthen the lower back. It is important that you begin exercising slowly and increase levels gradually.  Always begin any exercise program with stretching. If you feel pain while doing any of these exercises, stop and talk to your doctor about a more specific exercise program that better suits your condition.   Low back stretch  The point of stretching is to make you more flexible and increase your range of motion. Stretch only as much as you are able. Stretch slowly. Do not push your stretch to the limit. If at any point you feel pain while stretching, this is your (temporary) limit.    Lie on your back with your knees bent and both feet on the ground.    Slowly raise your left knee to your chest as you flatten your lower back against the floor. Hold for 5 seconds.    Relax and repeat the exercise with your right knee.    Do 10 of these exercises for each leg.    Repeat hugging both knees to your chest at the same time.  Building lower back strength  Start your exercise routine with 10 to 30 minutes a day, 1 to 3 times a day.  Initial exercises  Lying on your back:  1. Ankle pumps: Move your foot up and down, towards your head, and then away. Repeat 10 times with each foot.  2. Heel slides: Slowly bend your knee,  drawing the heel of your foot towards you. Then slide your heel/foot from you, straightening your knee. Do not lift your foot off the floor (this is not a leg lift).  3. Abdominal contraction: Bend your knees and put your hands on your stomach. Tighten your stomach muscles. Hold for 5 seconds, then relax. Repeat 10 times.  4. Straight leg raise: Bend one leg at the knee and keep the other leg straight. Tighten your stomach muscles. Slowly lift your straight leg 6 to 12 inches off the floor and hold for up to 5 seconds. Repeat 10 times on each side.  Standin. Wall squats: Stand with your back against the wall. Move your feet about 12 inches away from the wall. Tighten your stomach muscles, and slowly bend your knees until they are at about a 45 degree angle. Do not go down too far. Hold about 5 seconds. Then slowly return to your starting position. Repeat 10 times.  2. Heel raises: Stand facing the wall. Slowly raise the heels of your feet up and down, while keeping your toes on the floor. If you have trouble balancing, you can touch the wall with your hands. Repeat 10 times.  More advanced exercises  When you feel comfortable enough, try these exercises.  1. Kneeling lumbar extension: Begin on your hands and knees. At the same time, raise and straighten your right arm and left leg until they are parallel to the ground. Hold for 2 seconds and come back slowly to a starting position. Repeat with left arm and right leg, alternating 10 times.  2. Prone lumbar extension: Lie face down, arms extended overhead, palms on the floor. At the same time, raise your right arm and left leg as high as comfortably possible. Hold for 10 seconds and slowly return to start. Repeat with left arm and right leg, alternating 10 times. Gradually build up to 20 times. (Advanced: Repeat this exercise raising both arms and both legs a few inches off the floor at the same time. Hold for 5 seconds and release.)  3. Pelvic tilt: Lie on the  floor on your back with your knees bent at 90 degrees. Your feet should be flat on the floor. Inhale, exhale, then slowly contract your abdominal muscles bringing your navel toward your spine. Let your pelvis rock back until your lower back is flat on the floor. Hold for 10 seconds while breathing smoothly.  4. Abdominal crunch: Perform a pelvic tilt (above) flattening your lower back against the floor. Holding the tension in your abdominal muscles, take another breath and raise your shoulder blades off the ground (this is not a full sit-up). Keep your head in line with your body (don t bend your neck forward). Hold for 2 seconds, then slowly lower.  Date Last Reviewed: 6/1/2016 2000-2017 [employer]. [address] [place] [address], [place] [address]. All rights reserved. This information is not intended as a substitute for professional medical care. Always follow your healthcare professional's instructions.        Back Pain (Acute or Chronic)    Back pain is one of the most common problems. The good news is that most people feel better in 1 to 2 weeks, and most of the rest in 1 to 2 months. Most people can remain active.  People experience and describe pain differently; not everyone is the same.    The pain can be sharp, stabbing, shooting, aching, cramping or burning.    Movement, standing, bending, lifting, sitting, or walking may worsen pain.    It can be localized to one spot or area, or it can be more generalized.    It can spread or radiate upwards, to the front, or go down your arms or legs (sciatica).    It can cause muscle spasm.  Most of the time, mechanical problems with the muscles or spine cause the pain. Mechanical problems are usually caused by an injury to the muscles or ligaments. While illness can cause back pain, it is usually not caused by a serious illness. Mechanical problems include:     Physical activity such as sports, exercise, work, or normal activity    Overexertion, lifting,  pushing, pulling incorrectly or too aggressively    Sudden twisting, bending, or stretching from an accident, or accidental movement    Poor posture    Stretching or moving wrong, without noticing pain at the time    Poor coordination, lack of regular exercise (check with your doctor about this)    Spinal disc disease or arthritis    Stress  Pain can also be related to pregnancy, or illness like appendicitis, bladder or kidney infections, pelvic infections, and many other things.  Acute back pain usually gets better in 1 to 2 weeks. Back pain related to disk disease, arthritis in the spinal joints or spinal stenosis (narrowing of the spinal canal) can become chronic and last for months or years.  Unless you had a physical injury (for example, a car accident or fall) X-rays are usually not needed for the initial evaluation of back pain. If pain continues and does not respond to medical treatment, X-rays and other tests may be needed.  Home care  Try these home care recommendations:    When in bed, try to find a position of comfort. A firm mattress is best. Try lying flat on your back with pillows under your knees. You can also try lying on your side with your knees bent up towards your chest and a pillow between your knees.    At first, do not try to stretch out the sore spots. If there is a strain, it is not like the good soreness you get after exercising without an injury. In this case, stretching may make it worse.    Avoid prolong sitting, long car rides, or travel. This puts more stress on the lower back than standing or walking.    During the first 24 to 72 hours after an acute injury or flare up of chronic back pain, apply an ice pack to the painful area for 20 minutes and then remove it for 20 minutes. Do this over a period of 60 to 90 minutes or several times a day. This will reduce swelling and pain. Wrap the ice pack in a thin towel or plastic to protect your skin.    You can start with ice, then switch to  heat. Heat (hot shower, hot bath, or heating pad) reduces pain and works well for muscle spasms. Heat can be applied to the painful area for 20 minutes then remove it for 20 minutes. Do this over a period of 60 to 90 minutes or several times a day. Do not sleep on a heating pad. It can lead to skin burns or tissue damage.    You can alternate ice and heat therapy. Talk with your doctor about the best treatment for your back pain.    Therapeutic massage can help relax the back muscles without stretching them.    Be aware of safe lifting methods and do not lift anything without stretching first.  Medicines  Talk to your doctor before using medicine, especially if you have other medical problems or are taking other medicines.    You may use over-the-counter medicine as directed on the bottle to control pain, unless another pain medicine was prescribed. If you have chronic conditions like diabetes, liver or kidney disease, stomach ulcers, or gastrointestinal bleeding, or are taking blood thinners, talk to your doctor before taking any medicine.    Be careful if you are given a prescription medicines, narcotics, or medicine for muscle spasms. They can cause drowsiness, affect your coordination, reflexes, and judgement. Do not drive or operate heavy machinery.  Follow-up care  Follow up with your healthcare provider, or as advised.   A radiologist will review any X-rays that were taken. Your provide will notify you of any new findings that may affect your care.  Call 911  Call emergency services if any of the following occur:    Trouble breathing    Confusion    Very drowsy or trouble awakening    Fainting or loss of consciousness    Rapid or very slow heart rate    Loss of bowel or bladder control  When to seek medical advice  Call your healthcare provider right away if any of these occur:     Pain becomes worse or spreads to your legs    Weakness or numbness in one or both legs    Numbness in the groin or genital  area  Date Last Reviewed: 7/1/2016 2000-2017 [employer]. [address] [place] [address], [place] [address]. All rights reserved. This information is not intended as a substitute for professional medical care. Always follow your healthcare professional's instructions.        General Neck and Back Pain    Both neck and back pain are usually caused by injury to the muscles or ligaments of the spine. Sometimes the disks that separate each bone of the spine may cause pain by pressing on a nearby nerve. Back and neck pain may appear after a sudden twisting or bending force (such as in a car accident), or sometimes after a simple awkward movement. In either case, muscle spasm is often present and adds to the pain.  Acute neck and back pain usually gets better in 1 to 2 weeks. Pain related to disk disease, arthritis in the spinal joints or spinal stenosis (narrowing of the spinal canal) can become chronic and last for months or years.  Back and neck pain are common problems. Most people feel better in 1 or 2 weeks, and most of the rest in 1 to 2 months. Most people can remain active.  People have and describe pain differently.    Pain can be sharp, stabbing, shooting, aching, cramping, or burning    Movement, standing, bending, lifting, sitting, or walking may worsen the pain    Pain can be localized to one spot or area, or it can be more generalized    Pain can spread or radiate upwards, downwards, to the front, or go down your arms    Muscle spasm may occur.  Most of the time mechanical problems with the muscles or spine cause the pain. it is usually caused by an injury, whether known or not, to the muscles or ligaments. While illnesses can cause back pain, it is usually not caused by a serious illness. Pain is usually related to physical activity, whether sports, exercise, work, or normal activity. Sometimes it can occur without an identifiable cause. This can happen simply by stretching or moving wrong,  without noting pain at the time. Other causes include:    Overexertion, lifting, pushing, pulling incorrectly or too aggressively.    Sudden twisting, bending or stretching from an accident (car or fall), or accidental movement.    Poor posture    Poor conditioning, lack of regular exercise    Spinal disc disease or arthritis    Stress    Pregnancy, or illness like appendicitis, bladder or kidney infection, pelvic infections   Home care    For neck pain: Use a comfortable pillow that supports the head and keeps the spine in a neutral position. The position of the head should not be tilted forward or backward.    When in bed, try to find a position of comfort. A firm mattress is best. Try lying flat on your back with pillows under your knees. You can also try lying on your side with your knees bent up towards your chest and a pillow between your knees.    At first, do not try to stretch out the sore spots. If there is a strain, it is not like the good soreness you get after exercising without an injury. In this case, stretching may make it worse.    Don't sit for long periods, as in long car rides or other travel. This puts more stress on the lower back than standing or walking.    During the first 24 to 72 hours after an injury, apply an ice pack to the painful area for 20 minutes and then remove it for 20 minutes over a period of 60 to 90 minutes or several times a day.     You can alternate ice and heat therapies. Talk with your healthcare provider about the best treatment for your back or neck pain. As a safety precaution, do not use a heating pad at bedtime. Sleeping with a heating pad can lead to skin burns or tissue damage.    Therapeutic massage can help relax the back and neck muscles without stretching them.    Be aware of safe lifting methods and do not lift anything over 15 pounds until all the pain is gone.  Medicines  Talk to your healthcare provider before using medicine, especially if you have other  medical problems or are taking other medicines.    You may use over-the-counter medicine to control pain, unless another pain medicine was prescribed. If you have chronic conditions like diabetes, liver or kidney disease, stomach ulcers,  gastrointestinal bleeding, or are taking blood thinner medicines.    Be careful if you are given pain medicines, narcotics, or medicine for muscle spasm. They can cause drowsiness, and can affect your coordination, reflexes, and judgment. Do not drive or operate heavy machinery.  Follow-up care  Follow up with your healthcare provider, or as advised. Physical therapy or further tests may be needed.  If X-rays were taken, you will be notified of any new findings that may affect your care.  Call 911  Call 911 if any of the following occur:    Trouble breathing    Confusion    Very drowsy or trouble awakening    Fainting or loss of consciousness    Rapid or very slow heart rate    Loss of bowel or bladder control  When to seek medical advice  Call your healthcare provider right away if any of these occur:    Pain becomes worse or spreads into your arms or legs    Weakness, numbness or pain in one or both arms or legs    Numbness in the groin area    Difficulty walking    Fever of 100.4 F (38 C) or higher, or as directed by your healthcare provider  Date Last Reviewed: 7/1/2016 2000-2017 [employer]. [address] [place] [address], [place] [id number]. All rights reserved. This information is not intended as a substitute for professional medical care. Always follow your healthcare professional's instructions.        Back Basics: A Healthy Spine  A healthy spine supports the body while letting it move freely. It does this with the help of three natural curves. Strong, flexible muscles help, too. They support the spine by keeping its curves properly aligned. The disks that cushion the bones of your spine also play a role in back fitness.    Three natural curves  The spine is made  of bones (vertebrae) and pads of soft tissue (disks). These parts are arranged in three curves: cervical, thoracic, and lumbar. When properly aligned, these curves keep your body balanced. They also support your body when you move. By distributing your weight throughout your spine, the curves make back injuries less likely.  Strong, flexible muscles  Strong, flexible back muscles help support the three curves of the spine. They do so by holding the vertebrae and disks in proper alignment. Strong, flexible abdominal, hip, and leg muscles also reduce strain on the back.  The lumbar curve  The lumbar curve is the hardest-working part of the spine. It carries more weight and moves the most. Aligning this curve helps prevent damage to vertebrae, disks, and other parts of the spine.  Cushioning disks  Disks are the soft pads of tissue between the vertebrae. The disks absorb shock caused by movement. Each disk has a spongy center (nucleus) and a tougher outer ring (annulus). Movement within the nucleus allows the vertebrae to rock back and forth on the disks. This provides the flexibility needed to bend and move.       Date Last Reviewed: 10/18/2015    1355-3128 [employer]. [address] [place], [place] [id number]. All rights reserved. This information is not intended as a substitute for professional medical care. Always follow your healthcare professional's instructions.        Back Care Tips    Caring for your back  These are things you can do to prevent a recurrence of acute back pain and to reduce symptoms from chronic back pain:    Maintain a healthy weight. If you are overweight, losing weight will help most types of back pain.    Exercise is an important part of recovery from most types of back pain. The muscles behind and in front of the spine support the back. This means strengthening both the back muscles and the abdominal muscles will provide better support for your spine.     Swimming and brisk  walking are good overall exercises to improve your fitness level.    Practice safe lifting methods (below).    Practice good posture when sitting, standing and walking. Avoid prolonged sitting. This puts more stress on the lower back than standing or walking.    Wear quality shoes with sufficient arch support. Foot and ankle alignment can affect back symptoms. Women should avoid wearing high heels.    Therapeutic massage can help relax the back muscles without stretching them.    During the first 24 to 72 hours after an acute injury or flare-up of chronic back pain, apply an ice pack to the painful area for 20 minutes and then remove it for 20 minutes, over a period of 60 to 90 minutes, or several times a day. As a safety precaution, do not use a heating pad at bedtime. Sleeping on a heating pad can lead to skin burns or tissue damage.    You can alternate ice and heat therapies.  Medicines  Talk to your healthcare provider before using medicines, especially if you have other medical problems or are taking other medicines.    You may use acetaminophen or ibuprofen to control pain, unless your healthcare provider prescribed other pain medicine. If you have chronic conditions like diabetes, liver or kidney disease, stomach ulcers, or gastrointestinal bleeding, or are taking blood thinners, talk with your healthcare provider before taking any medicines.    Be careful if you are given prescription pain medicines, narcotics, or medicine for muscle spasm. They can cause drowsiness, affect your coordination, reflexes, and judgment. Do not drive or operate heavy machinery while taking these types of medicines. Take prescription pain medicine only as prescribed by your healthcare provider.  Lumbar stretch  Here is a simple stretching exercise that will help relax muscle spasm and keep your back more limber. If exercise makes your back pain worse, don t do it.    Lie on your back with your knees bent and both feet on the  ground.    Slowly raise your left knee to your chest as you flatten your lower back against the floor. Hold for 5 seconds.    Relax and repeat the exercise with your right knee.    Do 10 of these exercises for each leg.  Safe lifting method    Don t bend over at the waist to lift an object off the floor.  Instead, bend your knees and hips in a squat.     Keep your back and head upright    Hold the object close to your body, directly in front of you.    Straighten your legs to lift the object.     Lower the object to the floor in the reverse fashion.    If you must slide something across the floor, push it.  Posture tips  Sitting  Sit in chairs with straight backs or low-back support. Keep your knees lower than your hips, with your feet flat on the floor.  When driving, sit up straight. Adjust the seat forward so you are not leaning toward the steering wheel.  A small pillow or rolled towel behind your lower back may help if you are driving long distances.   Standing  When standing for long periods, shift most of your weight to one leg at a time. Alternate legs every few minutes.   Sleeping  The best way to sleep is on your side with your knees bent. Put a low pillow under your head to support your neck in a neutral spine position. Avoid thick pillows that bend your neck to one side. Put a pillow between your legs to further relax your lower back. If you sleep on your back, put pillows under your knees to support your legs in a slightly flexed position. Use a firm mattress. If your mattress sags, replace it, or use a 1/2-inch plywood board under the mattress to add support.  Follow-up care  Follow up with your healthcare provider, or as advised.  If X-rays, a CT scan or an MRI scan were taken, they will be reviewed by a radiologist. You will be notified of any new findings that may affect your care.  Call 911  Call 911 if any of the following occur:    Trouble breathing    Confusion    Very drowsy    Fainting or loss  of consciousness    Rapid or very slow heart rate    Loss of  bowel or bladder control  When to seek medical advice  Call your healthcare provider right away if any of the following occur:    Pain becomes worse or spreads to your arms or legs    Weakness or numbness in one or both arms or legs    Numbness in the groin area  Date Last Reviewed: 6/1/2016 2000-2017 [employer]. [address] [place], [place] [id number]. All rights reserved. This information is not intended as a substitute for professional medical care. Always follow your healthcare professional's instructions.        Relieving Back Pain  Back pain is a common problem. You can strain back muscles by lifting too much weight or just by moving the wrong way. Back strain can be uncomfortable, even painful. And it can take weeks or months to improve. To help yourself feel better and prevent future back strains, try these tips.  Important Note: Do not give aspirin to children or teens without first discussing it with your healthcare provider.      ? Ice    Ice reduces muscle pain and swelling. It helps most during the first 24 to 48 hours after an injury.    Wrap an ice pack or a bag of frozen peas in a thin towel. (Never place ice directly on your skin.)    Place the ice where your back hurts the most.    Don t ice for more than 20 minutes at a time.    You can use ice several times a day.  ? Medicines  Over-the-counter pain relievers can include acetaminophen and anti-inflammatory medicines, which includes aspirin or ibuprofen. They can help ease discomfort. Some also reduce swelling.    Tell your healthcare provider about any medicines you are already taking.    Take medicines only as directed.  ? Heat  After the first 48 hours, heat can relax sore muscles and improve blood flow.    Try a warm bath or shower. Or use a heating pad set on low. To prevent a burn, keep a cloth between you and the heating pad.    Don t use a heating pad for  more than 15 minutes at a time. Never sleep on a heating pad.  Date Last Reviewed: 9/1/2015 2000-2017 [employer]. [address] [facility], [place] [address]. All rights reserved. This information is not intended as a substitute for professional medical care. Always follow your healthcare professional's instructions.        Caring for Your Back Throughout the Day  Take care of your back throughout the day. You will likely have fewer back problems if you do. Try to warm up before you move. Shift positions often. Also do your best to form healthy habits.    Warm up for the day  Do a few slow, catlike stretches before starting your day. This simple warmup can soften your disks, stretch your back muscles, and help prevent injuries.  Shift positions often  At work and at home, change positions often. This helps keep your body from getting stiff. Stand up or lean back while you sit. If you can, get up and move every 1/2 hour.  Form healthy habits  Here are some suggestions:     Keep a healthy weight. When you weigh too much, your back is under excess strain. But losing just a few extra pounds can help a lot.    Try not to overeat. Learn about serving sizes. The size of a serving depends on the food and the food group. Many foods list serving sizes on the labels.    Handle minor aches with cold and heat. Apply cold the first 24 to 48 hours. Use heat after that. Always place a thin cloth between your skin and the source of cold or heat.    Take medicines as directed. This helps keep pain under control. Always read labels, and call your healthcare provider or pharmacist if you have any questions.  Walk each day  A daily walk keeps your back and thigh muscles stretched and strong. This gives your back better support. Be sure to walk with your spine s three curves aligned, by keeping your head, hips, and toes connected by a vertical line.   Date Last Reviewed: 10/18/2015    3752-7430 [employer]  [address] [place] [address]. All rights reserved. This information is not intended as a substitute for professional medical care. Always follow your healthcare professional's instructions.        How Your Back Works  A healthy back lets you bend and stretch without pain. The spine has 3 natural curves. These keep your body balanced. Strong, flexible muscles support your spine. Soft, cushioning disks separate the hard bones of your spine. The disks let your spine bend and move.    The parts of the spine    The vertebrae are the 24 bones that make up the spine.    The spinous process is the part of each vertebra you can feel through your skin.    Each of these bones has a central hole that runs top to bottom. Together these holes form a tunnel called the spinal canal.    The lamina of each vertebra forms the back of the spinal canal.    Running through the canal are nerves. They are attached in a bundle called the spinal cord for most of the canal.    A foramen is a small opening where a spinal nerve root leaves the spinal canal.    Disks serve as cushions between vertebrae. A disk s soft center absorbs shock during movement.     Two vertebrae and a disk     The supporting muscles  Strong, flexible muscles help maintain your 3 natural curves. They hold your spine in correct alignment. This helps support your upper body. Strong core muscles help take the strain off your back. These include the stomach, buttock, and thigh muscles.  Date Last Reviewed: 1/1/2018 2000-2017 [employer]. [address] [place] [address]. All rights reserved. This information is not intended as a substitute for professional medical care. Always follow your healthcare professional's instructions.        Relieving Tension in Your Back  Being relaxed helps keep your mind healthy and your back ready to move. Take short breaks often. Walk around. Stretch. Switch tasks. Also give the following a try.  Make  time to relax. Start by setting aside 5 minutes daily.   Deep breathing    Deep breathing is a simple way to reduce stress. You can do it almost any time you need to relax.    Inhale slowly through your nose. Let your lungs and stomach expand.    Hold your breath for 2 to 3 seconds.    Exhale slowly through your mouth until your lungs feel empty. Repeat 3 to 4 times.  Relieve tension  Muscle tension can create tender spots called trigger points. The tips below may help relieve muscle tension.    Press the trigger point if you can reach it. If not, lie on a soft tennis ball, or ask a friend to press the spot. Use steady pressure for 10 to 15 seconds. Breathe deeply. Repeat a few times.    Massage trigger points with ice for 2 to 5 minutes. Press lightly at first. Slowly increase firmness.  Date Last Reviewed: 10/18/2015    7551-9544 [employer]. [address] [place], [place] [id number]. All rights reserved. This information is not intended as a substitute for professional medical care. Always follow your healthcare professional's instructions.        Back Safety: Basics of Good Posture  Good posture helps protect you from injury. It also increases your comfort. Aim for good posture throughout the day.    Check your posture  The human body works best when it is properly aligned. To improve your standing posture, follow these steps:    Take a moment to close your eyes and feel your body. Then breathe deeply and relax your shoulders, hips, and knees.    Now, from the very top of your head, lift up just a bit. Think of a line linking your ears, shoulders, hips, and ankles. Adjust your body to follow the line. You may need to relax your hips and tuck your buttocks under a bit.    Next, take a look at yourself in a mirror. Is one ear, shoulder, or hip higher than the other? They should be level.  Check how you sit  When you sit properly, pressure on your back is reduced. Try these steps:    Sit so that the  curve of your lower back fits easily against the chair. Keep your gaze level.    Support your feet. They should be flat on the floor or on a footrest. Your knees should be level with your hips.    Adjust the chair height as needed. Sit so your forearms are level with the work surface.  Proper posture helps  When your back is aligned, it s more likely to stay safe throughout the day.    Standing in place. Rest one foot on a stool or low box to ease pressure on your lower back. Switch feet often. If you can, adjust the height of your work surface so your neck and shoulders aren t under strain.    Driving. Sit close enough to the steering wheel to keep your knees slightly bent. For comfort, your knees should be level with your hips or just a bit lower. Sit as straight as you can. The curve of your lower back should be fully supported.    Walking. Stand tall and walk with your head up. Let your arms swing while you walk. This helps relax muscles. Wear shoes that fit and support your feet. If you will be standing or walking for a long time, don t wear high heels.    Sitting and sleeping. Choose your furniture with care. Make sure it s not causing or increasing your back pain. Chairs should allow for comfortable, correct sitting posture. Use pillows for added support if needed. Your bed should support your back s natural curves without being too hard or too soft.  Date Last Reviewed: 10/18/2015    [id number] [employer]. [address] [place] [address], [place] [address]. All rights reserved. This information is not intended as a substitute for professional medical care. Always follow your healthcare professional's instructions.      RIGHT AND LEFT ROTATION INTO PAIN SITTING UP 30 TIMES TWICE DAILY    RIGHT AND LEFT LATERAL BENDING INTO PAIN 30 TIMES TWICE DAILY    NECK EXTENSION 30 TIME TWICE DAILY    NECK FLEXION  30 TIMES TWICE DAILY    MASSAGE BACK OF NECK MULTIPLE TIMES THROUGHOUT DAY AS TOLERATED    PRONE  POSITION NECK ROTATION RIGHT AND LEFT 30 TIMES TWICE DAILY    NECK EXTENSION 30 TIMES TWICE DAILY    ROTATE NECK IN [place] RIGHTWARD AND LEFT BEY    AVOID ANY EXERCISE WHICH RESULTS IN SHOOTING NERVE PAIN DOWN THE ARM OR SHOULDER BLADE    Assessment: Lower back pain    Plan: do these exercises at least twice daily:  Standing or sitting exercise can be done every two hours    Beau' Flexion Versus Amalia   Extension Exercises For   Low Back Pain   Examples of Beau' Flexion Exercises  1. Pelvic tilt.  Please press the small of your back against the floor.  Start with 5-10  and increase to 100 count over one month   2. Single Knee to chest. Lie on your back with legs in bent position. Alternate one leg and the other very slowly bringing the knee to chest.  Start with a count of 5-10   Over one month work up to 100  3. Double knee to chest.  Lie on your back with knees in bent position.  Bring both knees to the chest slowly hold for a count of 5-to 10. Over one month work to 100  4. Partial sit-up or crunch.  Lie on your back in bent leg position.  Please bring your body with arms crossed in front  To 30 degrees of flexion. Start with 5-10 over one month work up to 100 or more  5. Sit back.  Please sit on the side of the bed or a stair landing  And lie backwards until the abdominal muscles start to quiver.  Hold for a count of 5-10 and over a month work up to 100.  5. Hamstring stretch.  Please extend your legs while sitting on the floor as tolerated for 5-10 count.  Gradually increase to count of 30 over one month      Alternately find a stair landing or sturdy chair and place heel  In a comfortable level of extension  And stretch one hamstring at a time for 5-10 seconds.  Increase to count of 30 over one month                         Squat.  Stand with legs comfortably apart and lower the body slowly by flexing the knees  for count of 5-10 over one month increase to 30.  Useful for anterior disc protrusion,  facette joint arthritis spond-10ylolysis, spondylolisthesis and spinal stenosis    Katelyn method or extension exercises  Useful disc bulging posteriorly  1. Prone pressups  Please lie on your abdomen.   Please do a push with the upper half of your body only.  This should not cause the pain to shoot down your buttock thigh leg or foot  Start with 10 and repeat x 3 one minute apart.  Repeat x 10 every 1-2 hours  Pain tends to increase in the center of back  And leaves buttock thighs legs and foot over time  You may shift your pelvis in opposite direction of buttock and leg pain to achieve even better results  2. Superman with arms extended  Or at the side Simultaneously lift your arms and legs off the floor. Start with 5-10 over one month increase to 100.  3. Cat stretch or cobra Start  As if in extended position of prone pressup but hold the stretch for 5 or 10 count. Over one moth to count of 30.  4.  Extensions can be done in standing position  As well if prone pressup is inconvenient.  Shift your pelvis in opposite direction of limb pain.  Put your hands  Flat on your buttocks and lean backwards without loss of balance.  Count 10 x 3 times then 10 extensions hourly         Back Safety: Poor Posture Hurts  An unhealthy spine often starts with bad habits. Poor movement patterns and posture problems are common causes of back pain. Disk, bone, nerve, and soft tissue problems can all be affected by poor posture. They can lead to pain, stiffness, and other symptoms.    Poor posture backfires  Poor posture can cause pain. Too much slouching puts increased pressure on the disks. An excessive lumbar curve can overload and inflame the vertebrae. As a result, the back muscles may tighten or spasm to  splint  and protect the spine. This adds to the pain you feel.    Proper posture: The key to safe movement  Your spine bears your weight throughout the day. This is true whether you re sleeping, standing, or bending. Certain  positions strain your spine more than others. But by maintaining proper posture in all positions, you can reduce the stress on your spine.       To improve your standing posture, follow these steps:    Breathe deeply.    Relax your shoulders, hips, and ankles.   Think of the ears, shoulders, hips, and ankles as a series of dots. Now, adjust your body to connect the dots in a straight line.    Tuck your buttocks in just a bit if you need to.      Date Last Reviewed: 10/28/2015    4026-1267 [employer]. [address] [place] [address]. All rights reserved. This information is not intended as a substitute for professional medical care. Always follow your healthcare professional's instructions.        Back Exercises: Lower Back Stretch    To start, sit in a chair with your feet flat on the floor. Shift your weight slightly forward. Relax, and keep your ears, shoulders, and hips aligned while you do the following:    Sit with your feet well apart.    Bend forward and touch the floor with the backs of your hands. Relax and let your body drop.    Hold for 20 seconds. Return to starting position.    Repeat 2 times.   Date Last Reviewed: 11/1/2017 2000-2017 [employer]. [address] [place] [address]. All rights reserved. This information is not intended as a substitute for professional medical care. Always follow your healthcare professional's instructions.        Back Exercises: Lower Back Rotation    To start, lie on your back with your knees bent and feet flat on the floor. Don t press your neck or lower back to the floor. Breathe deeply. You should feel comfortable and relaxed in this position.    Drop both knees to one side. Turn your head to the other side. Keep your shoulders flat on the floor.    Do not push through pain.    Hold for 20 seconds.    Slowly switch sides.    Repeat 2 to 5 times.  Date Last Reviewed: 10/11/2015    4071-0773 [employer]  [address] [place] [address]. All rights reserved. This information is not intended as a substitute for professional medical care. Always follow your healthcare professional's instructions.        Back Exercises: Partial Curl-Ups    To start, lie on your back with your knees bent and feet flat on the floor. Don t press your neck or lower back to the floor. Breathe deeply. You should feel comfortable and relaxed in this position:    Cross your arms loosely.    Tighten your abdomen and curl FPC up, keeping your head in line with your shoulders.    Hold for 5 seconds. Uncurl to lie down.    Repeat 2 sets of 10.   Date Last Reviewed: 8/16/2015 2000-2017 [employer]. [address] [place] [address]. All rights reserved. This information is not intended as a substitute for professional medical care. Always follow your healthcare professional's instructions.        Lower Body Exercises: Quad Stretch    This exercise stretches  your lower body to help your back. As you work out, don t rush or strain. Stand arm s length from a wall. Place one hand on it.  Here are the other steps to the quad stretch:    With your other hand, grasp your ankle on the same side. Pull the heel toward your buttocks. Don t arch your back.    Hold for 30 to 60 seconds. Repeat 2 times. Switch legs.  For your safety, check with your healthcare provider before starting an exercise program.   Date Last Reviewed: 11/1/2017 2000-2017 [employer]. [address] [place] [address]. All rights reserved. This information is not intended as a substitute for professional medical care. Always follow your healthcare professional's instructions.        Shoulder and Upper Back Stretch  To start, stand tall with your ears, shoulders, and hips in line. Your feet should be slightly apart, positioned just under your hips. Focus your eyes directly in front of you.  this position for a few  seconds before starting your exercise. This helps increase your awareness of proper posture.          Reach overhead and slightly back with both arms. Keep your shoulders and neck aligned and your elbows behind your shoulders:    With your palms facing the ceiling, turn your fingers inward.    Take a deep breath. Breathe out, and lower your elbows toward your buttocks. Hold for 5 seconds, then return to starting position.    Repeat 3 times.  Date Last Reviewed: 11/1/2017 2000-2017 [employer]. [address] [place] [address]. All rights reserved. This information is not intended as a substitute for professional medical care. Always follow your healthcare professional's instructions.        Shoulder Shrug Exercise    To start, sit in a chair with your feet flat on the floor. Shift your weight slightly forward to avoid rounding your back. Relax. Keep your ears, shoulders, and hips aligned:    Raise both of your shoulders as high as you can, as if you were trying to touch them to your ears. Keep your head and neck still and relaxed.    Hold for a count of 10. Release.    Repeat 5 times.  For your safety, check with your healthcare provider before starting an exercise program.   Date Last Reviewed: 11/1/2017 2000-2017 [employer]. [address] [place] [address]. All rights reserved. This information is not intended as a substitute for professional medical care. Always follow your healthcare professional's instructions.        Shoulder Squeeze Exercise    To start, sit in a chair with your feet flat on the floor. Shift your weight slightly forward to avoid rounding your back. Relax. Keep your ears, shoulders, and hips aligned:    Raise your arms to shoulder height, elbows bent and palms forward.    Move your arms back, squeezing your shoulder blades together.    Hold for 10 seconds. Return to starting position.     Repeat 5 times.   For your safety, check with your  healthcare provider before starting an exercise program.   Date Last Reviewed: 11/1/2017 2000-2017 "[employer]. [address] [place] [address]. All rights reserved. This information is not intended as a substitute for professional medical care. Always follow your healthcare professional's instructions.        Neck Exercises: Active Neck Rotation    To start, lie on your back, knees bent and feet flat on the floor. Keep your ears, shoulders, and hips aligned, but don t press your lower back to the floor. Rest your hands on your pelvis. Breathe deeply and relax.  Here are the steps for the active neck rotation:    Use your neck muscles to turn your head to one side until you feel a stretch in the muscles.    Hold for 5 seconds. Then turn to the other side.    Repeat 5 times on each side.  Note: Keep your shoulders on the floor. Don t lift or tuck your chin as you turn your head.  Date Last Reviewed: 11/1/2017 2000-2017 The [employer]. [address] [place] [address]. All rights reserved. This information is not intended as a substitute for professional medical care. Always follow your healthcare professional's instructions.                Follow-ups after your visit        Who to contact     If you have questions or need follow up information about today's clinic visit or your schedule please contact [facility] directly at [phone].  Normal or non-critical lab and imaging results will be communicated to you by MyChart, letter or phone within 4 business days after the clinic has received the results. If you do not hear from us within 7 days, please contact the clinic through MyChart or phone. If you have a critical or abnormal lab result, we will notify you by phone as soon as possible.  Submit refill requests through [employer] or call your pharmacy and they will forward the refill request to us. Please allow 3 business days for  "your refill to be completed.          Additional Information About Your Visit        [employer] Information     [employer] lets you send messages to your doctor, view your test results, renew your prescriptions, schedule appointments and more. To sign up, go to [email or web address] . Click on \"Log in\" on the left side of the screen, which will take you to the Welcome page. Then click on \"Sign up Now\" on the right side of the page.     You will be asked to enter the access code listed below, as well as some personal information. Please follow the directions to create your username and password.     Your access code is: [email or web address]  Expires: 2018  4:01 PM     Your access code will  in 90 days. If you need help or a new code, please call your [place] clinic or [phone].        Care EveryWhere ID     This is your [facility] EveryWhere ID. This could be used by other organizations to access your [facility] medical records  [id number]        Your Vitals Were     Pulse Temperature Respirations Pulse Oximetry BMI (Body Mass Index)       66 96.5  F (35.8  C) (Tympanic) 16 98% 23.6 kg/m2        Blood Pressure from Last 3 Encounters:   18 146/88   18 162/84   18 118/74    Weight from Last 3 Encounters:   18 174 lb (78.9 kg)   18 174 lb (78.9 kg)   18 165 lb (74.8 kg)              We Performed the Following     XR Lumbar Spine 2/3 Views        Primary Care Provider Office Phone # Fax #    [facility]win Mary Lou Fleming [phone] [address] [facility] [address]        Equal Access to Services     [facility] : Earnest Kye, khadar mirza, sonja willoughby, pantera montano. [facility] [phone].    ATENCIÓN: Si habla español, tiene a reyes disposición servicios gratuitos de asistencia lingüística. Llame al [phone].    We comply with applicable federal civil rights laws and [place] laws. We do not " discriminate on the basis of race, color, national origin, age, disability, sex, sexual orientation, or gender identity.            Thank you!     Thank you for choosing [facility]  for your care. Our goal is always to provide you with excellent care. Hearing back from our patients is one way we can continue to improve our services. Please take a few minutes to complete the written survey that you may receive in the mail after your visit with us. Thank you!             Your Updated Medication List - Protect others around you: Learn how to safely use, store and throw away your medicines at www.disposemymeds.org.          This list is accurate as of 7/30/18  3:05 PM.  Always use your most recent med list.                   Brand Name Dispense Instructions for use Diagnosis    albuterol 108 (90 Base) MCG/ACT Inhaler    PROAIR HFA/PROVENTIL HFA/VENTOLIN HFA    1 Inhaler    Inhale 2 puffs into the lungs every 6 hours as needed for shortness of breath / dyspnea or wheezing    Viral upper respiratory tract infection       diclofenac 1 % Gel topical gel    VOLTAREN    100 g    Apply 4 grams to knees or 2 grams to hands four times daily using enclosed dosing card.    Neck injury, subsequent encounter       fluticasone 50 MCG/ACT spray    FLONASE    48 mL    SHAKE LIQUID AND USE 1 TO 2 SPRAYS IN EACH NOSTRIL DAILY    Chronic seasonal allergic rhinitis, unspecified trigger       indapamide 2.5 MG tablet    LOZOL    90 tablet    Take 1 tablet (2.5 mg) by mouth daily    Hypertension goal BP (blood pressure) < 140/80       ipratropium 0.06 % spray    ATROVENT    1 Box    Spray 2 sprays into both nostrils 4 times daily as needed for rhinitis    Viral upper respiratory tract infection       olopatadine HCl 0.2 % Soln    PATADAY    1 Bottle    Place 1 drop into both eyes as needed    Seasonal allergic conjunctivitis       predniSONE 10 MG tablet    DELTASONE    7 tablet    Take 1 tablet (10 mg) by  mouth daily    Viral upper respiratory tract infection       sildenafil 20 MG tablet    REVATIO    100 tablet    Take 1 tablet (20 mg) by mouth 3 times daily For pulmonary hypertension.  Never use with nitroglycerin, terazosin or doxazosin.    Sexual dysfunction

## 2018-08-01 RX ORDER — INDAPAMIDE 1.25 MG/1
1.2 TABLET ORAL DAILY
Qty: 90 TABLET | Refills: 3 | Status: SHIPPED | OUTPATIENT
Start: 2018-08-01 | End: 2019-11-25
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
## 2018-11-21 ENCOUNTER — TELEPHONE (OUTPATIENT)
Dept: FAMILY MEDICINE | Facility: CLINIC | Age: 58
End: 2018-11-21

## 2018-11-21 DIAGNOSIS — G89.29 CHRONIC LEFT SHOULDER PAIN: ICD-10-CM

## 2018-11-21 DIAGNOSIS — M25.512 CHRONIC LEFT SHOULDER PAIN: ICD-10-CM

## 2018-11-21 DIAGNOSIS — M54.50 CHRONIC MIDLINE LOW BACK PAIN WITHOUT SCIATICA: Primary | ICD-10-CM

## 2018-11-21 DIAGNOSIS — G89.29 CHRONIC MIDLINE LOW BACK PAIN WITHOUT SCIATICA: Primary | ICD-10-CM

## 2018-11-21 NOTE — TELEPHONE ENCOUNTER
Reason for Call:  Other call back    Detailed comments: Jaren called asked for a relaxing medication to take before he has an MRI.    Phone Number Patient can be reached at: Cell number on file:    Telephone Information:   Mobile [phone]       Best Time: any    Can we leave a detailed message on this number? YES    Call taken on 11/21/2018 at 10:21 AM by Margarita Leos

## 2018-11-21 NOTE — TELEPHONE ENCOUNTER
"Patient is requesting something to \"knock me out\" in order to have imaging done. States his chiropractor ordered an MRI of his spine. He went to open side imaging office in [facility]. Pt was unable to do test and would like to schedule again but looking for Rx first. Ok to wait for PCP review.  "

## 2018-11-23 RX ORDER — LORAZEPAM 1 MG/1
1 TABLET ORAL EVERY 8 HOURS PRN
Qty: 2 TABLET | Refills: 0 | Status: SHIPPED | OUTPATIENT
Start: 2018-11-23 | End: 2019-11-25

## 2018-11-23 NOTE — TELEPHONE ENCOUNTER
(M54.5,  G89.29) Chronic midline low back pain without sciatica  (primary encounter diagnosis)    Comment:      Plan: LORazepam (ATIVAN) 1 MG tablet                   (M25.512,  G89.29) Chronic left shoulder pain    Comment:      Plan: LORazepam (ATIVAN) 1 MG tablet           2 MG ONE HOUR PRIOR TO PROCEDURE                YUKI JENNINGS JR., MD

## 2019-02-08 ENCOUNTER — TELEPHONE (OUTPATIENT)
Dept: FAMILY MEDICINE | Facility: CLINIC | Age: 59
End: 2019-02-08

## 2019-02-08 NOTE — TELEPHONE ENCOUNTER
Panel Management Review      Patient has the following on his problem list:       Composite cancer screening  Chart review shows that this patient is due/due soon for the following None  Summary:    Patient is due/failing the following:   BP    Action needed:   Patient needs office visit for hypertension.    Type of outreach:    Sent letter.    Questions for provider review:    None                                                                                                                                    Yessenia Kenyon CMA

## 2019-02-08 NOTE — LETTER
February 8, 2019    Jaren Carmona  [address]  [place] [id number]    Dear Anette Eastman cares about your health and your health plan.  I have reviewed your medical conditions, medication list and lab results, and am making recommendations based on this review to better manage your health.    You are in particular need of attention regarding:  -High Blood Pressure    I am recommending that you:     -schedule a FOLLOWUP OFFICE APPOINTMENT with me.        Please call us at the [place] location:  [phone] or use [employer] to address the above recommendations.     Thank you for trusting [facility].  We appreciate the opportunity to serve you and look forward to supporting your healthcare in the future.    If you have (or plan to have) any of these tests done at a facility other than a [facility] or a [facility], please have the results sent to the [facility] location noted above.      Best Regards,    Kerwin Fleming Jr MD

## 2019-11-25 ENCOUNTER — OFFICE VISIT (OUTPATIENT)
Dept: FAMILY MEDICINE | Facility: CLINIC | Age: 59
End: 2019-11-25
Payer: COMMERCIAL

## 2019-11-25 VITALS
BODY MASS INDEX: 25.43 KG/M2 | WEIGHT: 187.5 LBS | DIASTOLIC BLOOD PRESSURE: 68 MMHG | TEMPERATURE: 97.9 F | SYSTOLIC BLOOD PRESSURE: 110 MMHG | OXYGEN SATURATION: 96 % | HEART RATE: 94 BPM

## 2019-11-25 DIAGNOSIS — M47.815 OSTEOARTHRITIS OF THORACOLUMBAR SPINE, UNSPECIFIED SPINAL OSTEOARTHRITIS COMPLICATION STATUS: ICD-10-CM

## 2019-11-25 DIAGNOSIS — I10 HYPERTENSION GOAL BP (BLOOD PRESSURE) < 140/80: Chronic | ICD-10-CM

## 2019-11-25 DIAGNOSIS — J06.9 VIRAL UPPER RESPIRATORY TRACT INFECTION: Primary | ICD-10-CM

## 2019-11-25 DIAGNOSIS — M54.2 NECK PAIN: ICD-10-CM

## 2019-11-25 PROCEDURE — 99214 OFFICE O/P EST MOD 30 MIN: CPT | Performed by: FAMILY MEDICINE

## 2019-11-25 RX ORDER — INDAPAMIDE 1.25 MG/1
1.2 TABLET ORAL DAILY
Qty: 90 TABLET | Refills: 3 | Status: SHIPPED | OUTPATIENT
Start: 2019-11-25 | End: 2019-11-25

## 2019-11-25 RX ORDER — RIBOFLAVIN (VITAMIN B2) 100 MG
3 TABLET ORAL DAILY
Qty: 90 TABLET | Refills: 11 | Status: SHIPPED | OUTPATIENT
Start: 2019-11-25 | End: 2021-01-26

## 2019-11-25 RX ORDER — INDAPAMIDE 1.25 MG/1
1.2 TABLET ORAL DAILY
Qty: 90 TABLET | Refills: 3 | Status: SHIPPED | OUTPATIENT
Start: 2019-11-25 | End: 2020-04-17
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
## 2019-11-25 NOTE — PATIENT INSTRUCTIONS
"Assessment: Lower back pain    Plan: do these exercises at least twice daily:  Standing or sitting exercise can be done every two hours        POMEGRANATE JUICE INFLAMMATION    COLLAGEN TYPE 2  INTERNET PHARMACY    CHICKEN BONE BROTH 1-2 OUNCES DAILY    FISH OIL ONE DAILY     PAPAYA  AND PINEAPPLE SCAR HEALING     BLUEBERRIES FOR INFLAMMATION    SPINACH FOR INFLAMMATION     BOSWELLIA DARWIN    CURCUMIN     PINE BARK EXTRACT        AVOID SUGARS     AVOID NIGHT SHADES     POTATOES     PEPPERS     TOMATOES    \"LEAKY GUT SYNDROME\"        YUKI JENNINGS JR., MD    \ANKLE 10 STRETCHES IN ALL DIRECTIONS UP DOWN AND BOTH SIDES, STRETCH FOOT AGAINST THE WALL, WRITE THE ALPHABET WITH BIG TOE BALANCE FOR 30 SECONDS WALK A STRAIGHT LINE, LARGE THAN SMALL FIGURE OF 8 , FINALLY SIDE TO SIDE WITH QUICK STOPS ICE BEFORE AND AFTER EXERCISE    BALANCE PRACTICE  ON EACH FOOT COUNTING TO 30  LIFTING FOOT TO FRONT TO SIDE AND TO BACK ALTERNATING LEGS.  WHEN BRUSHING TEETH    KEEP ON CLEAR LIQUIDS UNTIL SYMPTOMS SUBSIDE IE GATROADE POWERADE OR 7UP  bananas rice applesauce and or electrolyte drinks  CONSIDER EMETROL 1-2 TEASPOONS EVERY 2 HOURS PRN NAUSEA OR VOMITING    CALF STRETCH Calves     Stretches lower leg muscles in two ways: with knee straight and knee bent. While standing, place your hands on a wall, with arms outstretched, elbows straight. Keeping your left knee slightly bent, toes of right foot slightly turned inward, move your right foot back one or two feet, with your right heel and foot flat on the floor. You should feel a stretch in your right calf muscle, but you shouldn't feel uncomfortable. If you don't feel a stretch, move your right foot farther back until you do. Keep your right knee straight and hold that position for 10 to 30 seconds. Continuing to keep your right heel and foot on the floor, bend your right knee and hold for another 10 to 30 seconds. Repeat with opposite leg. Repeat 3 to 5 times on each side. "     TREAT WITH 6 8 OZ GLASSES OF WATER, 5-7 SERVINGS OF FRUITS, WHOLE GRAINS, AND OR   VEGETABLES WALK AFTER EACH MEAL  RAISIN BRAN CEREAL 1-3 BOWLS DAILY          5 -10MINUTES WALKING WALK IN PLACE , DANCING OR AEROBIC EXERCISE THREE TIMES DAILY     REDUCES RISK OF DYING 50%  ,THAT IS 25 MINUTES PER DAY     BALANCE WITH KNEE RAISED TO PREVENT FALLS INITIALLY EYES OPEN ON ONE FOOT 6    PREVENTS FALLS 40%    STRENGTHENING UPPER EXTREMETIES WITH 80% MAXIMAL LIFT STRENGTHENS LUMBAR AND THORACIC SPINE    YOU CAN DO THE SAME THING WITH 1 POUND WEIGHTS  100 EACH POSTION X 5     YUKI JENNINGS JR., MD

## 2019-11-27 ENCOUNTER — TELEPHONE (OUTPATIENT)
Dept: FAMILY MEDICINE | Facility: CLINIC | Age: 59
End: 2019-11-27

## 2019-11-27 DIAGNOSIS — R05.9 COUGH: Primary | ICD-10-CM

## 2019-11-27 RX ORDER — BENZONATATE 200 MG/1
200 CAPSULE ORAL 3 TIMES DAILY PRN
Qty: 21 CAPSULE | Refills: 0 | Status: SHIPPED | OUTPATIENT
Start: 2019-11-27 | End: 2020-04-17

## 2019-11-27 NOTE — TELEPHONE ENCOUNTER
I sent an Rx for Tessalon pearls 200 mg to his pharmacy.  He can use that up to 3 times daily. Keep pushing fluids, using Mucinex or Robitussin.  If not improving over weekend then he should be seen next week

## 2019-11-27 NOTE — TELEPHONE ENCOUNTER
Reason for Call:  Other call back    Detailed comments: The patient called and stated that he saw Dr. Kerwin Fleming on 11/25 and his cough has not gotten any better since then.  He would like a call back to discuss options for a medication to help him with these symptoms.  He's worried that he could lose his job over this due to him missing the past 2 days of work.      Phone Number Patient can be reached at: Home number on file [phone] (home)    Best Time: Any    Can we leave a detailed message on this number? YES    Call taken on 11/27/2019 at 8:30 AM by Sakina Jerome

## 2019-11-27 NOTE — TELEPHONE ENCOUNTER
Pt called reporting intermittent cough and wheezing for two days. Denies chest pain or coughing up blood. States he has tried the following medications without relief: mucinex, Nyquil, dayquil, Excedrin and hot lemon with honey. Pt asked for abx to help with symptoms. Writer explained abx would not help viral infection. He is requesting rx to help with cough. States he is unable to sleep or work. He would schedule an appt if requested by providers but notes he has to pay out of pocket for why he would prefer not to schedule an appt. Please advice on request.

## 2020-04-17 ENCOUNTER — VIRTUAL VISIT (OUTPATIENT)
Dept: FAMILY MEDICINE | Facility: CLINIC | Age: 60
End: 2020-04-17
Payer: COMMERCIAL

## 2020-04-17 ENCOUNTER — TELEPHONE (OUTPATIENT)
Dept: FAMILY MEDICINE | Facility: CLINIC | Age: 60
End: 2020-04-17

## 2020-04-17 DIAGNOSIS — R41.3 MILD MEMORY DISTURBANCE: Primary | ICD-10-CM

## 2020-04-17 DIAGNOSIS — J30.1 SEASONAL ALLERGIC RHINITIS DUE TO POLLEN: ICD-10-CM

## 2020-04-17 DIAGNOSIS — J06.9 VIRAL UPPER RESPIRATORY TRACT INFECTION: ICD-10-CM

## 2020-04-17 DIAGNOSIS — I10 HYPERTENSION GOAL BP (BLOOD PRESSURE) < 140/80: Chronic | ICD-10-CM

## 2020-04-17 DIAGNOSIS — E55.9 VITAMIN D DEFICIENCY: ICD-10-CM

## 2020-04-17 DIAGNOSIS — R41.3 MILD MEMORY DISTURBANCE: ICD-10-CM

## 2020-04-17 PROCEDURE — 99214 OFFICE O/P EST MOD 30 MIN: CPT | Mod: 95 | Performed by: FAMILY MEDICINE

## 2020-04-17 RX ORDER — CHOLECALCIFEROL (VITAMIN D3) 50 MCG
1 TABLET ORAL DAILY
Qty: 90 TABLET | Refills: 11 | Status: SHIPPED | OUTPATIENT
Start: 2020-04-17 | End: 2020-04-28

## 2020-04-17 RX ORDER — INDAPAMIDE 1.25 MG/1
1.2 TABLET ORAL DAILY
Qty: 90 TABLET | Refills: 3 | Status: SHIPPED | OUTPATIENT
Start: 2020-04-17 | End: 2020-04-28

## 2020-04-17 RX ORDER — FLUTICASONE PROPIONATE 50 MCG
SPRAY, SUSPENSION (ML) NASAL
Qty: 48 ML | Refills: 3 | Status: SHIPPED | OUTPATIENT
Start: 2020-04-17 | End: 2020-04-28

## 2020-04-17 RX ORDER — VITAMIN E 268 MG
800 CAPSULE ORAL 2 TIMES DAILY
Qty: 60 CAPSULE | Refills: 11 | Status: SHIPPED | OUTPATIENT
Start: 2020-04-17 | End: 2020-04-28

## 2020-04-17 RX ORDER — ALBUTEROL SULFATE 90 UG/1
2 AEROSOL, METERED RESPIRATORY (INHALATION) EVERY 6 HOURS PRN
Qty: 1 INHALER | Refills: 0 | Status: SHIPPED | OUTPATIENT
Start: 2020-04-17 | End: 2020-04-28

## 2020-04-17 RX ORDER — IPRATROPIUM BROMIDE 42 UG/1
2 SPRAY, METERED NASAL 4 TIMES DAILY PRN
Qty: 1 BOX | Refills: 3 | Status: SHIPPED | OUTPATIENT
Start: 2020-04-17 | End: 2020-04-28

## 2020-04-17 NOTE — PATIENT INSTRUCTIONS
(R41.3) Mild memory disturbance  (primary encounter diagnosis)  Comment: OPEN SIDED MRI RECOMMENDED WHEN COVID EPIDEMIC IS OVER   Plan: vitamin E (TOCOPHEROL) 400 units (360 mg)         capsule             YUKI JENNINGS JR., MD
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
## 2020-04-28 RX ORDER — IPRATROPIUM BROMIDE 42 UG/1
2 SPRAY, METERED NASAL 4 TIMES DAILY PRN
Qty: 1 BOX | Refills: 3 | COMMUNITY
Start: 2020-04-28 | End: 2021-01-26

## 2020-04-28 RX ORDER — INDAPAMIDE 1.25 MG/1
1.2 TABLET ORAL DAILY
Qty: 90 TABLET | Refills: 3 | COMMUNITY
Start: 2020-04-28 | End: 2020-06-11

## 2020-04-28 RX ORDER — ALBUTEROL SULFATE 90 UG/1
2 AEROSOL, METERED RESPIRATORY (INHALATION) EVERY 6 HOURS PRN
Qty: 1 INHALER | Refills: 0 | Status: SHIPPED | OUTPATIENT
Start: 2020-04-28 | End: 2021-01-26

## 2020-04-28 RX ORDER — CHOLECALCIFEROL (VITAMIN D3) 50 MCG
1 TABLET ORAL DAILY
Qty: 90 TABLET | Refills: 11 | COMMUNITY
Start: 2020-04-28 | End: 2021-01-26

## 2020-04-28 RX ORDER — FLUTICASONE PROPIONATE 50 MCG
SPRAY, SUSPENSION (ML) NASAL
Qty: 48 ML | Refills: 3 | Status: SHIPPED | OUTPATIENT
Start: 2020-04-28 | End: 2020-06-11

## 2020-04-28 RX ORDER — VITAMIN E 268 MG
800 CAPSULE ORAL 2 TIMES DAILY
Qty: 60 CAPSULE | Refills: 11 | COMMUNITY
Start: 2020-04-28 | End: 2021-01-26

## 2020-04-28 NOTE — PATIENT INSTRUCTIONS
(J06.9) Viral upper respiratory tract infection  Comment:    Plan: albuterol (PROAIR HFA/PROVENTIL HFA/VENTOLIN         HFA) 108 (90 Base) MCG/ACT inhaler, ipratropium        (ATROVENT) 0.06 % nasal spray             (I10) Hypertension goal BP (blood pressure) < 140/80  Comment:     Plan: indapamide (LOZOL) 1.25 MG tablet, Basic         metabolic panel  (Ca, Cl, CO2, Creat, Gluc, K,         Na, BUN)                (J30.1) Seasonal allergic rhinitis due to pollen  Comment:    Plan: fluticasone (FLONASE) 50 MCG/ACT nasal spray  YUKI JENNINGS JR., MD              (E55.9) Vitamin D deficiency  Comment:     Plan: vitamin D3 (CHOLECALCIFEROL) 2000 units (50         mcg) tablet  YUKI JENNINGS JR., MD
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
## 2020-06-10 ENCOUNTER — TELEPHONE (OUTPATIENT)
Dept: FAMILY MEDICINE | Facility: CLINIC | Age: 60
End: 2020-06-10

## 2020-06-10 NOTE — TELEPHONE ENCOUNTER
Reason for call:  Medication   If this is a refill request, has the caller requested the refill from the pharmacy already? No  Will the patient be using a [facility]? No  Name of the pharmacy and phone number for the current request: [facility] [address] of [address]    Name of the medication requested: He didn't remember name, just said med for claustrophobia he used to take    Other request:    Phone number to reach patient:  Cell number on file:    Telephone Information:   Mobile [phone]       Best Time:  Anytime    Can we leave a detailed message on this number?  YES    Travel screening: Not Applicable

## 2020-06-11 ENCOUNTER — VIRTUAL VISIT (OUTPATIENT)
Dept: FAMILY MEDICINE | Facility: CLINIC | Age: 60
End: 2020-06-11
Payer: COMMERCIAL

## 2020-06-11 DIAGNOSIS — R73.01 IFG (IMPAIRED FASTING GLUCOSE): ICD-10-CM

## 2020-06-11 DIAGNOSIS — N18.30 CKD (CHRONIC KIDNEY DISEASE) STAGE 3, GFR 30-59 ML/MIN (H): ICD-10-CM

## 2020-06-11 DIAGNOSIS — F41.9 ANXIETY: ICD-10-CM

## 2020-06-11 DIAGNOSIS — E78.5 HYPERLIPIDEMIA LDL GOAL <130: ICD-10-CM

## 2020-06-11 DIAGNOSIS — E55.9 VITAMIN D INSUFFICIENCY: ICD-10-CM
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
## 2020-06-11 DIAGNOSIS — I10 HYPERTENSION GOAL BP (BLOOD PRESSURE) < 140/80: Chronic | ICD-10-CM

## 2020-06-11 DIAGNOSIS — J30.1 SEASONAL ALLERGIC RHINITIS DUE TO POLLEN: Primary | ICD-10-CM

## 2020-06-11 DIAGNOSIS — M54.2 NECK PAIN: ICD-10-CM

## 2020-06-11 PROCEDURE — 99214 OFFICE O/P EST MOD 30 MIN: CPT | Mod: 95 | Performed by: INTERNAL MEDICINE

## 2020-06-11 RX ORDER — FLUTICASONE PROPIONATE 50 MCG
SPRAY, SUSPENSION (ML) NASAL
Qty: 48 ML | Refills: 3 | Status: SHIPPED | OUTPATIENT
Start: 2020-06-11 | End: 2021-01-26

## 2020-06-11 RX ORDER — DIAZEPAM 2 MG
TABLET ORAL
Qty: 2 TABLET | Refills: 0 | Status: SHIPPED | OUTPATIENT
Start: 2020-06-11 | End: 2021-01-26

## 2020-06-11 RX ORDER — INDAPAMIDE 1.25 MG/1
1.2 TABLET ORAL DAILY
Qty: 90 TABLET | Refills: 3 | Status: SHIPPED | OUTPATIENT
Start: 2020-06-11 | End: 2021-01-26

## 2020-06-11 ASSESSMENT — ANXIETY QUESTIONNAIRES
7. FEELING AFRAID AS IF SOMETHING AWFUL MIGHT HAPPEN: NOT AT ALL
6. BECOMING EASILY ANNOYED OR IRRITABLE: NOT AT ALL
2. NOT BEING ABLE TO STOP OR CONTROL WORRYING: NOT AT ALL
3. WORRYING TOO MUCH ABOUT DIFFERENT THINGS: NOT AT ALL
GAD7 TOTAL SCORE: 1
1. FEELING NERVOUS, ANXIOUS, OR ON EDGE: SEVERAL DAYS
IF YOU CHECKED OFF ANY PROBLEMS ON THIS QUESTIONNAIRE, HOW DIFFICULT HAVE THESE PROBLEMS MADE IT FOR YOU TO DO YOUR WORK, TAKE CARE OF THINGS AT HOME, OR GET ALONG WITH OTHER PEOPLE: NOT DIFFICULT AT ALL
5. BEING SO RESTLESS THAT IT IS HARD TO SIT STILL: NOT AT ALL

## 2020-06-11 ASSESSMENT — PATIENT HEALTH QUESTIONNAIRE - PHQ9: 5. POOR APPETITE OR OVEREATING: NOT AT ALL

## 2020-06-11 NOTE — PATIENT INSTRUCTIONS
Medications for your anxiety, allergies and B.P are sent to your pharmacy.   Please do the fasting lab works ordered given your Kidney failure problem in last labs on 2015.     ========================    Patient Education     Treating Anxiety Disorders with Therapy    If you have an anxiety disorder, you don t have to suffer anymore. Treatment is available. Therapy (also called counseling) is often a helpful treatment for anxiety disorders. With therapy, a specially trained professional (therapist) helps you face and learn to manage your anxiety. Therapy can be short-term or long-term depending on your needs. In some cases, medicine may also be prescribed with therapy. It may take time before you notice how much therapy is helping, but stick with it. With therapy, you can feel better.  Cognitive behavioral therapy (CBT)  Cognitive behavioral therapy (CBT) teaches you to manage anxiety. It does this by helping you understand how you think and act when you re anxious. Research has shown CBT to be a very effective treatment for anxiety disorders. How CBT is run is almost like a class. It involves homework and activities to build skills that teach you to cope with anxiety step by step. It can be done in a group or one-on-one, and often takes place for a set number of sessions. CBT has two main parts:    Cognitive therapy helps you identify the negative, irrational thoughts that occur with your anxiety. You ll learn to replace these with more positive, realistic thoughts.    Behavioral therapy helps you change how you react to anxiety. You ll learn coping skills and methods for relaxing to help you better deal with anxiety.  Other forms of therapy  Other therapy methods may work better for you than CBT. Or, you may move from CBT to another form of therapy as your treatment needs change. This may mean meeting with a therapist by yourself or in a group. Therapy can also help you work through problems in your life, such as  drug or alcohol dependence, that may be making your anxiety worse.  Getting better takes time  Therapy will help you feel better and teach you skills to help manage anxiety long term. But change doesn t happen right away. It takes a commitment from you. And treatment only works if you learn to face the causes of your anxiety. So, you might feel worse before you feel better. This can sometimes make it hard to stick with it. But remember: Therapy is a very effective treatment. The results will be well worth it.  Helping yourself  If anxiety is wearing you down, here are some things you can do to cope:    Check with your doctor and rule out any physical problems that may be causing the anxiety symptoms.    If an anxiety disorder is diagnosed, seek mental healthcare. This is an illness and it can respond to treatment. Most types of anxiety disorders will respond to talk therapy and medicine.    Educate yourself about anxiety disorders. Keep track of helpful online resources and books you can use during stressful periods.    Try stress management techniques such as meditation.    Consider online or in-person support groups.    Don t fight your feelings. Anxiety feeds itself. The more you worry about it, the worse it gets. Instead, try to identify what might have triggered your anxiety. Then try to put this threat in perspective.    Keep in mind that you can t control everything about a situation. Change what you can and let the rest take its course.    Exercise -- it s a great way to relieve tension and help your body feel relaxed.    Examine your life for stress, and try to find ways to reduce it.    Avoid caffeine and nicotine, which can make anxiety symptoms worse.    Fight the temptation to turn to alcohol or unprescribed drugs for relief. They only make things worse in the long run.   Date Last Reviewed: 1/1/2017 2000-2019 [employer]. [address] [facility], [place] [address]. All rights reserved.  This information is not intended as a substitute for professional medical care. Always follow your healthcare professional's instructions.
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
## 2020-06-11 NOTE — ASSESSMENT & PLAN NOTE
This is a chronic health problem that is well controlled with current medications and lifestyle measures. Patient has been using Indapamide 1.25 mg daily, checks home BP  At [facility] 2 months 120/80 and denies any headache.

## 2020-06-11 NOTE — ASSESSMENT & PLAN NOTE
This is a chronic health problem that is uncontrolled with current medications and lifestyle measures.Sometimes 2x/week Ibuprofen, excedrin for headaches. Following outside Spine / Pain specialist who is planning a CT of the neck which patient is very nervous about it and requesting for Valium before the imaging to decrease his apprehension.

## 2020-06-12 ASSESSMENT — ANXIETY QUESTIONNAIRES: GAD7 TOTAL SCORE: 1

## 2020-06-30 ENCOUNTER — VIRTUAL VISIT (OUTPATIENT)
Dept: FAMILY MEDICINE | Facility: CLINIC | Age: 60
End: 2020-06-30
Payer: COMMERCIAL

## 2020-06-30 DIAGNOSIS — M54.50 CHRONIC MIDLINE LOW BACK PAIN WITHOUT SCIATICA: ICD-10-CM
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
## 2020-06-30 DIAGNOSIS — S09.90XS CLOSED HEAD INJURY, SEQUELA: Primary | ICD-10-CM

## 2020-06-30 DIAGNOSIS — I10 HYPERTENSION GOAL BP (BLOOD PRESSURE) < 140/80: Chronic | ICD-10-CM

## 2020-06-30 DIAGNOSIS — G89.29 CHRONIC MIDLINE LOW BACK PAIN WITHOUT SCIATICA: ICD-10-CM

## 2020-06-30 DIAGNOSIS — M54.2 NECK PAIN: ICD-10-CM

## 2020-06-30 PROCEDURE — 99214 OFFICE O/P EST MOD 30 MIN: CPT | Mod: 95 | Performed by: FAMILY MEDICINE
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
## 2021-01-26 ENCOUNTER — OFFICE VISIT (OUTPATIENT)
Dept: INTERNAL MEDICINE | Facility: CLINIC | Age: 61
End: 2021-01-26

## 2021-01-26 ENCOUNTER — TELEPHONE (OUTPATIENT)
Dept: INTERNAL MEDICINE | Facility: CLINIC | Age: 61
End: 2021-01-26

## 2021-01-26 VITALS
TEMPERATURE: 96.6 F | HEIGHT: 72 IN | WEIGHT: 160.2 LBS | HEART RATE: 82 BPM | BODY MASS INDEX: 21.7 KG/M2 | OXYGEN SATURATION: 98 % | SYSTOLIC BLOOD PRESSURE: 130 MMHG | DIASTOLIC BLOOD PRESSURE: 88 MMHG

## 2021-01-26 DIAGNOSIS — R35.89 POLYURIA: ICD-10-CM

## 2021-01-26 DIAGNOSIS — R63.1 POLYDIPSIA: ICD-10-CM

## 2021-01-26 DIAGNOSIS — Z12.11 SCREEN FOR COLON CANCER: ICD-10-CM

## 2021-01-26 DIAGNOSIS — I10 BENIGN ESSENTIAL HYPERTENSION: ICD-10-CM

## 2021-01-26 DIAGNOSIS — E11.65 TYPE 2 DIABETES MELLITUS WITH HYPERGLYCEMIA, WITHOUT LONG-TERM CURRENT USE OF INSULIN (H): ICD-10-CM

## 2021-01-26 DIAGNOSIS — E11.65 TYPE 2 DIABETES MELLITUS WITH HYPERGLYCEMIA, WITHOUT LONG-TERM CURRENT USE OF INSULIN (H): Primary | ICD-10-CM

## 2021-01-26 DIAGNOSIS — R01.1 SYSTOLIC MURMUR: ICD-10-CM

## 2021-01-26 LAB
ALBUMIN UR-MCNC: NEGATIVE MG/DL
ANION GAP SERPL CALCULATED.3IONS-SCNC: 6 MMOL/L (ref 3–14)
APPEARANCE UR: CLEAR
BILIRUB UR QL STRIP: NEGATIVE
BUN SERPL-MCNC: 14 MG/DL (ref 7–30)
CALCIUM SERPL-MCNC: 10.1 MG/DL (ref 8.5–10.1)
CHLORIDE SERPL-SCNC: 101 MMOL/L (ref 94–109)
CO2 SERPL-SCNC: 27 MMOL/L (ref 20–32)
COLOR UR AUTO: YELLOW
CREAT SERPL-MCNC: 0.99 MG/DL (ref 0.66–1.25)
GFR SERPL CREATININE-BSD FRML MDRD: 83 ML/MIN/{1.73_M2}
GLUCOSE SERPL-MCNC: 461 MG/DL (ref 70–99)
GLUCOSE UR STRIP-MCNC: >=1000 MG/DL
HGB UR QL STRIP: NEGATIVE
KETONES UR STRIP-MCNC: ABNORMAL MG/DL
LEUKOCYTE ESTERASE UR QL STRIP: NEGATIVE
NITRATE UR QL: NEGATIVE
PH UR STRIP: 5 PH (ref 5–7)
POTASSIUM SERPL-SCNC: 4.4 MMOL/L (ref 3.4–5.3)
SODIUM SERPL-SCNC: 133 MMOL/L (ref 133–144)
SOURCE: ABNORMAL
SP GR UR STRIP: 1.01 (ref 1–1.03)
UROBILINOGEN UR STRIP-ACNC: 0.2 EU/DL (ref 0.2–1)

## 2021-01-26 PROCEDURE — 81003 URINALYSIS AUTO W/O SCOPE: CPT | Performed by: INTERNAL MEDICINE

## 2021-01-26 PROCEDURE — 36415 COLL VENOUS BLD VENIPUNCTURE: CPT | Performed by: INTERNAL MEDICINE

## 2021-01-26 PROCEDURE — 99214 OFFICE O/P EST MOD 30 MIN: CPT | Performed by: INTERNAL MEDICINE

## 2021-01-26 PROCEDURE — 80048 BASIC METABOLIC PNL TOTAL CA: CPT | Performed by: INTERNAL MEDICINE

## 2021-01-26 RX ORDER — GLUCOSAMINE HCL/CHONDROITIN SU 500-400 MG
CAPSULE ORAL
Qty: 100 EACH | Refills: 3 | Status: SHIPPED | OUTPATIENT
Start: 2021-01-26

## 2021-01-26 RX ORDER — METFORMIN HCL 500 MG
TABLET, EXTENDED RELEASE 24 HR ORAL
Qty: 93 TABLET | Refills: 1 | Status: SHIPPED | OUTPATIENT
Start: 2021-01-26 | End: 2021-03-02

## 2021-01-26 RX ORDER — PEN NEEDLE, DIABETIC 30 GX3/16"
1 NEEDLE, DISPOSABLE MISCELLANEOUS DAILY
Qty: 30 EACH | Refills: 11 | Status: SHIPPED | OUTPATIENT
Start: 2021-01-26

## 2021-01-26 RX ORDER — LANCETS
EACH MISCELLANEOUS
Qty: 50 EACH | Refills: 11 | Status: SHIPPED | OUTPATIENT
Start: 2021-01-26 | End: 2021-03-02

## 2021-01-26 ASSESSMENT — ENCOUNTER SYMPTOMS
POLYPHAGIA: 0
POLYDIPSIA: 1

## 2021-01-26 ASSESSMENT — MIFFLIN-ST. JEOR: SCORE: 1584.66

## 2021-01-26 NOTE — NURSING NOTE
Chief Complaint   Patient presents with     Hypertension     pt is not fasting, concern of high bp since stopping medication     /84   Pulse 82   Temp 96.6  F (35.9  C) (Temporal)   Ht 1.829 m (6')   Wt 72.7 kg (160 lb 3.2 oz)   SpO2 98%   BMI 21.73 kg/m   Estimated body mass index is 21.73 kg/m  as calculated from the following:    Height as of this encounter: 1.829 m (6').    Weight as of this encounter: 72.7 kg (160 lb 3.2 oz).        Health Maintenance due pending provider review:  Pt declines to schedule f/u or phx today        Myrtle Keita, CMA

## 2021-01-26 NOTE — TELEPHONE ENCOUNTER
Dr. Darby,     Pt notified, he would like to try the insulin vial, metformin. Should he also get referred to the diabetic educator?    Pt asking about being put on a blood pressure medication. He is concerned that since he stopped his BP med (indapamide), than his BP is going to get out of control.

## 2021-01-26 NOTE — TELEPHONE ENCOUNTER
Call pt - new onset diabetes explains his polyuria. Polydipsia  He will require insulin + metformin due to symptoms  Can choose between pen or vial. Lacks insurance so vial is cheaper but needs to use syringe, etc.   Will need education on use, diabetic supplies.

## 2021-01-26 NOTE — PROGRESS NOTES
Assessment & Plan     Type 2 diabetes mellitus with hyperglycemia, without long-term current use of insulin (H)  New diagnosis-given symptoms including weight loss and glucose level I think he requires basal insulin immediately  Start with 10 units Lantus.  He will need education on use.  Also start with Metformin  Follow-up in several weeks.    - insulin glargine (LANTUS SOLOSTAR) 100 UNIT/ML pen; Inject 10 Units Subcutaneous At Bedtime  - Insulin Pen Needle (PEN NEEDLES) 32G X 4 MM MISC; 1 Device daily  - blood glucose monitoring (NO BRAND SPECIFIED) meter device kit; Use to test blood sugar 2 times daily or as directed. Preferred blood glucose meter OR supplies to accompany: Blood Glucose Monitor Brands: cheapest  - blood glucose (NO BRAND SPECIFIED) test strip; Use to test blood sugar 2 times daily or as directed. To accompany: Blood Glucose Monitor Brands: per insurance.  - thin (NO BRAND SPECIFIED) lancets; Use with lanceting device. To accompany: Blood Glucose Monitor Brands: per insurance.  - alcohol swab prep pads; Use to swab area of injection/srinath as directed.  - AMBULATORY ADULT DIABETES EDUCATOR REFERRAL; Future  - metFORMIN (GLUCOPHAGE-XR) 500 MG 24 hr tablet; Take 1 tablet (500 mg) by mouth daily (with dinner) for 4 days, THEN 2 tablets (1,000 mg) daily (with dinner) for 4 days, THEN 3 tablets (1,500 mg) daily (with dinner) for 7 days, THEN 4 tablets (2,000 mg) daily (with dinner).    Benign essential hypertension  Borderline-monitor  - Basic metabolic panel    Systolic murmur  Consistent with mitral insufficiency.  He does not have insurance and cost of the echo would be significant.  Is not needed right now but should be done at some point in the future    Polyuri  - UA reflex to Microscopic    Polydipsia    - Basic metabolic panel  - UA reflex to Microscopic    Screen for colon cancer  - Fecal colorectal cancer screen (FIT); Future    Review of the result(s) of each unique test -  3      Diagnosis or treatment significantly limited by social determinants of health -uninsured                   Return in about 2 weeks (around 2/9/2021).    Uriah Darby MD  [facility] FELICITAS Eastman is a 60 year old who presents to clinic today for the following health issues     HPI       Hypertension Follow-up      Do you check your blood pressure regularly outside of the clinic? Yes     Are you following a low salt diet? No    Are your blood pressures ever more than 140 on the top number (systolic) OR more   than 90 on the bottom number (diastolic), for example 140/90? Yes    Pt self reports symptoms of dehydration from medication, so he stopped taking the indapamide about one month ago.  Complains of frequent urination and excessive thirst.  He states the symptoms resolved once he was off the medication.  He has noted though that he is lost some weight.  Currently is not taking any medication          Review of Systems   Endocrine: Positive for polydipsia and polyuria. Negative for polyphagia.            Objective    /88   Pulse 82   Temp 96.6  F (35.9  C) (Temporal)   Ht 1.829 m (6')   Wt 72.7 kg (160 lb 3.2 oz)   SpO2 98%   BMI 21.73 kg/m    Body mass index is 21.73 kg/m .  Physical Exam   GENERAL APPEARANCE: alert and no distress  RESP: lungs clear to auscultation - no rales, rhonchi or wheezes  CV: regular rates and rhythm, normal S1 S2, no S3 or S4 and grade 2-3/6 holosystolic murmur heard best over the apex  MS: extremities normal- no gross deformities noted  SKIN: no suspicious lesions or rashes    Results for orders placed or performed in visit on 01/26/21 (from the past 24 hour(s))   Basic metabolic panel   Result Value Ref Range    Sodium 133 133 - 144 mmol/L    Potassium 4.4 3.4 - 5.3 mmol/L    Chloride 101 94 - 109 mmol/L    Carbon Dioxide 27 20 - 32 mmol/L    Anion Gap 6 3 - 14 mmol/L    Glucose 461 (H) 70 - 99 mg/dL    Urea Nitrogen  14 7 - 30 mg/dL    Creatinine 0.99 0.66 - 1.25 mg/dL    GFR Estimate 83 >60 mL/min/[1.73_m2]    GFR Estimate If Black >90 >60 mL/min/[1.73_m2]    Calcium 10.1 8.5 - 10.1 mg/dL   UA reflex to Microscopic   Result Value Ref Range    Color Urine Yellow     Appearance Urine Clear     Glucose Urine >=1000 (A) NEG^Negative mg/dL    Bilirubin Urine Negative NEG^Negative    Ketones Urine Trace (A) NEG^Negative mg/dL    Specific Gravity Urine 1.010 1.003 - 1.035    Blood Urine Negative NEG^Negative    pH Urine 5.0 5.0 - 7.0 pH    Protein Albumin Urine Negative NEG^Negative mg/dL    Urobilinogen Urine 0.2 0.2 - 1.0 EU/dL    Nitrite Urine Negative NEG^Negative    Leukocyte Esterase Urine Negative NEG^Negative    Source Midstream Urine

## 2021-01-27 ENCOUNTER — TELEPHONE (OUTPATIENT)
Dept: INTERNAL MEDICINE | Facility: CLINIC | Age: 61
End: 2021-01-27

## 2021-01-27 NOTE — TELEPHONE ENCOUNTER
"Spoke with pt and informed of notes below.  Pt made repeated comments about being worried about his blood pressure and not his blood sugars \"I was eating a lot of sugar and I stopped now\"    I stressed the importance of starting medication and making/keeping f/u appt. Pt informed to  medication and if there are any problems with getting medication, and starting/taking medication to call clinic back.  Pt did not want to schedule f/u just yet.  States he would call back to schedule.      Myrtle Keita CMA    "

## 2021-01-27 NOTE — TELEPHONE ENCOUNTER
" I found a discount program on the [employer] website (https://www.[employer])  $99 for up to 10 pens of lantus- that would be a supply that would likely last 3-5 mo depending on his final dose.   He needs to go to that site- sign up where it says \"savings offers\" and follow prompts. Print coupon and take to pharmacy.   I sent all of these to [facility] except insulin sent to both there and [facility].  meds likely cheaper at [facility] given no insurance.     We'll discuss BP at f/u visit - schedule for 3-4 wks.   "

## 2021-01-27 NOTE — TELEPHONE ENCOUNTER
Diabetes Education Scheduling Outreach #1:    Call to patient to schedule. Patient does not have insurance. Provided estimated cost. Patient stated that he is going to make some changes to his diet and call us back to schedule another time.    Charlotte Cheema OnCall  Diabetes and Nutrition Scheduling

## 2021-01-28 ENCOUNTER — PATIENT OUTREACH (OUTPATIENT)
Dept: EDUCATION SERVICES | Facility: CLINIC | Age: 61
End: 2021-01-28

## 2021-01-28 NOTE — PROGRESS NOTES
Jaren has a new dx of T2D with BG >400 on lab draw.   No A1C available, weight loss reported.     He declined DB ed, does not have insurance.     Insulin is indicated and Lantus was ordered, checked with pharm and cost is $958.    Called patient briefly who reports he was drinking pop the past few weeks (extra thirsty) but stopped 2 days ago with lab result.    He does eat 3 meals daily:  Br= cereal, changed to wheaties from sugar variety  Jess/Din= rice and meat - reduced portion    Recommend order 70/30 Reli-On insulin vial: 7 units BID (0.2/kg - low dose with no A1C available, if >9%, would rec  14 units BID). Cost is $24 .    Message sent to PCP with request for change to Rx- will need vial, syring, rec Reli-On meter.   Have patient's permission to email information and resources on how to administer insulin.     Discussion with Jaren included:  ~ he needs to get the Metformin at [facility], should be ~$4.  ~ Continue to eat 3 meals each day with rice and meat.  ~ Will send an update about med by email and some resources    Per PCP, Jaren was encouraged to go to the Lantus website and print a coupon for 10 packs of pens of insulin for $99, this would be a multi-months-long supply depending upon titration. This is from the website:      Emailed Jaren:  Hi Jaren,   Thanks for chatting and providing the information on the phone today!    I spoke with Dr. Darby and here is a choice for you:  1) You can go get the insulin pens and try to use a discount card. He prescribed the Lantus insulin pen for you- there is a discount available if you go online and request a savings card. It should make the cost of 10 boxes of insulin pens (about a year's supply) cost $99.  You would pick this up at [facility]. Put the pens in the refrigerator.    Here is a link to the website:  https://www.[employer]/sign-up-for-savings    Savings Card & Patient Support  Lantus  (insulin glargine injection) 100 Units/mL  What is Lantus   (insulin  glargine injection) 100 Units/mL?. Prescription Lantus is a long-acting insulin used to treat adults with type 2 diabetes and adults and pediatric patients (children 6 years and older) with type 1 diabetes for the control of high blood sugar.  www.lantus.com     You would start taking 10 units of Lantus insulin once each night at bedtime.    2) If that seems like too much to spend at once, or seems difficult, we can order a different kind of insulin that is $25 but it comes in a bottle instead of a pen. You would use the bottle and a syringe to take the insulin.   This would be purchased at [facility].     You would take 7 units of insulin before breakfast, and another 7 units before your evening meal, so 2 shots each day.     Here is a link to videos of how to use the pen or the vial of insulin, scroll down and click on the pen or vial to see the movie:  http://[email or web address]/clinical-integration/health-resources/diabetes/index.htm   Diabetes Information [facility]  Feel better and control complications with proper use of your injectable diabetes medicine and other treatment resources. Many medications use injector  pens,  and each is a little different.  www.[employer]         3) Doctor ordered a blood glucose meter to measure your blood sugar at home. The lowest price for meters is a [facility] where you can get a Reli-On meter for about $9 and 50 strips for about the same price.   Doctor has recommended checking your blood sugar 2 times each day: every morning when you wake, up then before lunch or dinner too. If possible, check some days before lunch and some nights before dinner.   I will attach a paper where you can write down your blood sugar numbers each day.   This will let Dr. Darby know if you are taking enough insulin or need more.     4) Doctor also ordered a diabetes pill, Metformin, that you can  at [facility], it should cost under $10. It is important to start this  right away as it will take 2-3 weeks to really start to help.     In addition to the blood sugar log sheet to write numbers on, here is a link to a video on how to use the meter:  https://www.[employer]Gigstarterube.com/watch?v=exnIx4F_Hec    It's a lot of information!   But, to summarize:  1) If you want to use the insulin pens (with the discount card you print off) go get them at [facility] and start taking the medicine tonight or Friday night.   If you want the insulin bottle instead, let me know and we will send a prescription to [facility].  2) Please go get the Metformin pills at [facility] and start them today or tomorrow.  3) Please go get a Reli-On blood sugar meter at [facility] and start checking your number 2x/day.     Next week, please call Dr. Darby's nurse to tell her your blood sugar numbers.     Thank you! Tatum Tee RD, LD, [facility]

## 2021-01-28 NOTE — TELEPHONE ENCOUNTER
He is newly diabetic- requiring insulin from the start.  He needs some education , as brief as can be done- at least something that can help him with administering the shots. Just anything -even if this isn't formal

## 2021-03-02 ENCOUNTER — TELEPHONE (OUTPATIENT)
Dept: INTERNAL MEDICINE | Facility: CLINIC | Age: 61
End: 2021-03-02

## 2021-03-02 DIAGNOSIS — I10 HYPERTENSION GOAL BP (BLOOD PRESSURE) < 140/80: Primary | Chronic | ICD-10-CM

## 2021-03-02 DIAGNOSIS — E11.65 TYPE 2 DIABETES MELLITUS WITH HYPERGLYCEMIA, WITHOUT LONG-TERM CURRENT USE OF INSULIN (H): ICD-10-CM

## 2021-03-02 RX ORDER — LISINOPRIL 10 MG/1
10 TABLET ORAL DAILY
Qty: 90 TABLET | Refills: 0 | Status: SHIPPED | OUTPATIENT
Start: 2021-03-02 | End: 2021-03-05

## 2021-03-02 RX ORDER — METFORMIN HCL 500 MG
TABLET, EXTENDED RELEASE 24 HR ORAL
Qty: 93 TABLET | Refills: 1 | Status: SHIPPED | OUTPATIENT
Start: 2021-03-02 | End: 2021-12-24

## 2021-03-02 RX ORDER — LANCETS
EACH MISCELLANEOUS
Qty: 50 EACH | Refills: 11 | Status: SHIPPED | OUTPATIENT
Start: 2021-03-02

## 2021-03-02 NOTE — TELEPHONE ENCOUNTER
Ok. Would rec'd ACEi for BP given diabetes. Super cheap for him.   See what his glucose is like and confirm what dose of insulin he is taking?   He is urinating a lot due to his glucose.

## 2021-03-02 NOTE — TELEPHONE ENCOUNTER
Patient informed.  Advised him keep us updated regarding any side effects or no improvement of blood pressure readings and any elevated glucose levels.     Pt. expressed understanding and acceptance of the plan. Had no further questions at this time.  Advised can call back to clinic at any time with concerns.       Carolyn BEEN, RN, PHN

## 2021-03-02 NOTE — TELEPHONE ENCOUNTER
Ok for lisinopril  At minimum - needs to  metformin - CHEAP  Will send in glucocard meter- this is CHEAP as are strips  Once I know glucose - could rec'd other rx - but as stated based on prior # and polyuria- insulin likely needed at least initially.

## 2021-03-02 NOTE — TELEPHONE ENCOUNTER
"Spoke to patient.     He is not sure what his blood sugar levels are since he has not been checking them. Patient stated he did not  the glucometer nor has he stated the insulin or taking the Metformin. Based on changing his diet 'dramatically'.  Patient stated he no longer consumes soda or sugar. And is asking \"please please I need blood pressure medication I am not making this up\".    Triage advised that last glucose level in chart >400 and we need to know if his blood sugar is still elevated. Patient informed to go to [facility] pharmacy, [facility] good coupon to  the glucometer.     Patient stated he still wants a blood pressure medication. Triage reiterated importance of monitoring blood suagar levels as this also affects kidney and blood pressure/ patient stated, \" I know but I need a blood pressure medication I can feel it and that I will schedule a follow up but I need this now\".    Carolyn BEEN, RN, PHN      "

## 2021-03-02 NOTE — TELEPHONE ENCOUNTER
"Pt calling re:Blood pressure readings averaging around 142/100 cannot use indapamide . \"Pulls too much water out of me\" . Pt does need a blood pressure medication he feels. Please advise.Alice Garcia RN    "

## 2021-03-05 ENCOUNTER — HOSPITAL ENCOUNTER (EMERGENCY)
Facility: CLINIC | Age: 61
Discharge: HOME | End: 2021-03-05
Attending: EMERGENCY MEDICINE | Admitting: EMERGENCY MEDICINE

## 2021-03-05 ENCOUNTER — NURSE TRIAGE (OUTPATIENT)
Dept: NURSING | Facility: CLINIC | Age: 61
End: 2021-03-05

## 2021-03-05 VITALS
SYSTOLIC BLOOD PRESSURE: 142 MMHG | OXYGEN SATURATION: 100 % | RESPIRATION RATE: 12 BRPM | DIASTOLIC BLOOD PRESSURE: 96 MMHG | HEART RATE: 55 BPM | TEMPERATURE: 98.2 F

## 2021-03-05 DIAGNOSIS — E13.69 OTHER SPECIFIED DIABETES MELLITUS WITH OTHER SPECIFIED COMPLICATION, UNSPECIFIED WHETHER LONG TERM INSULIN USE (H): ICD-10-CM

## 2021-03-05 DIAGNOSIS — I10 BENIGN ESSENTIAL HYPERTENSION: ICD-10-CM

## 2021-03-05 DIAGNOSIS — T78.3XXA ANGIOEDEMA, INITIAL ENCOUNTER: ICD-10-CM

## 2021-03-05 PROCEDURE — 250N000013 HC RX MED GY IP 250 OP 250 PS 637: Performed by: EMERGENCY MEDICINE

## 2021-03-05 PROCEDURE — 99283 EMERGENCY DEPT VISIT LOW MDM: CPT

## 2021-03-05 RX ORDER — DIPHENHYDRAMINE HCL 25 MG
25 CAPSULE ORAL ONCE
Status: COMPLETED | OUTPATIENT
Start: 2021-03-05 | End: 2021-03-05

## 2021-03-05 RX ORDER — DIPHENHYDRAMINE HCL 25 MG
25 CAPSULE ORAL ONCE
Status: DISCONTINUED | OUTPATIENT
Start: 2021-03-05 | End: 2021-03-05

## 2021-03-05 RX ORDER — AMLODIPINE BESYLATE 5 MG/1
5 TABLET ORAL DAILY
Qty: 15 TABLET | Refills: 0 | Status: SHIPPED | OUTPATIENT
Start: 2021-03-05 | End: 2022-02-14

## 2021-03-05 RX ADMIN — DIPHENHYDRAMINE HYDROCHLORIDE 25 MG: 25 CAPSULE ORAL at 15:11

## 2021-03-05 NOTE — TELEPHONE ENCOUNTER
Patient calling. 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 Developed swelling on left side of lips. Today went to work and put face mask on and lips started to swell even more. Unsure if because of face mask or meds or something else?- says he is newly on lisinopril, metformin, lantus- just started 2 or 3 days ago. No trouble breathing. Lip swelling getting worse and worse. See triage protocol. Advised ER, someone else to drive, immediately. He will go to ER but insists on driving himself, risks explained. Patient says he will be going to [facility].      Reason for Disposition    Taking an ACE Inhibitor medication  (e.g., benazepril/LOTENSIN, captopril/CAPOTEN, enalapril/VASOTEC, lisinopril/ZESTRIL)    Additional Information    Negative: Unresponsive, passed out or very weak    Negative: Swollen tongue    Negative: Difficulty breathing or wheezing    Negative: [1] Life-threatening reaction in the past to similar substance (e.g., food, insect bite/sting, chemical, etc.) AND [2] < 2 hours since exposure    Negative: Sounds like a life-threatening emergency to the triager    Negative: Followed a lip injury    Negative: Entire face is swollen    Negative: Followed an insect bite to the area    Negative: Cold sore suspected (sore on outer lip)    Negative: Mouth sores or ulcers on inner lip    Negative: [1] Contact with a chemical AND [2] some may have been swallowed    Negative: [1] Contact with a chemical AND [2] none entered the mouth    Protocols used: LIP SWELLING-A-AH

## 2021-03-05 NOTE — TELEPHONE ENCOUNTER
Patient calling. 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 Developed swelling on left side of lips. Today went to work and put face mask on and lips started to swell even more. Unsure because of face mask or meds or something else?- says he is newly on lisinopril, metformin, lantus- just started 2 or 3 days ago. No trouble breathing. Lip swelling getting worse and worse. See triage protocol. Advised ER, someone else to drive, immediately. He will go to ER but insists on driving himself, risks explained. Patient says he will be going to [facility].

## 2021-03-05 NOTE — ED TRIAGE NOTES
Patient started lisinopril and now has lower lip swelling. Denies airway issues or shortness of breath.

## 2021-03-05 NOTE — ED PROVIDER NOTES
History     Chief Complaint:   Lip Swelling    HPI  History supplemented by electronic chart review    Jaren Carmona is a 60 year old male with history of hypertension, hyperlipidemia, diabetes, who presents for evaluation of lip swelling. The patient reports that he had been taking indapamide but it was changed to Lisinopril 3 days ago. This morning, he started to develop facial swelling to his lower lip that has persisted.  He denies any pain, shortness of breath, rash, or itching. He has never had anything similar. He was not seen in clinic 3 days ago and the prescription was called in via phone.  He talked to a family member who is a physician in another state and was advised that amlodipine might be a good medication for him instead.  He states that he has been taking his Metformin but is not using the Lantus recently prescribed, as he states that his polyuria and thirst have greatly improved since adjusting his diet.    Review of Systems   All other systems reviewed and are negative.    Allergies:  Grass  Mold    Medications:  Lantus Solostar   Metformin  Lisinopril     Past Medical History:    Hypertension  Systolic mumur   Hyperlipidemia   Anxiety  diabetes      Social History:  The patient was unaccompanied to the ED.  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     Physical Exam     Patient Vitals for the past 24 hrs:   BP Temp Temp src Pulse Resp SpO2   03/05/21 1449 (!) 142/96 98.2  F (36.8  C) Temporal 55 12 100 %     Physical Exam  General: Male sitting upright on edge of bed in room 27, casually working on his phone and with some paperwork  HENT: mucous membranes moist, no visible swelling to tongue or posterior oropharynx, no difficulty controlling secretions, moderate diffuse swelling to lower lip  CV: rate as above  Resp: normal effort, speaks in full phrases, no stridor, no cough observed  GI: abdomen soft and nontender  MSK: no bony tenderness  Skin: No facial rash, no  "urticaria  Neuro: alert, clear speech, oriented  Psych: cooperative, pleasant    Emergency Department Course     Emergency Department Course:    Reviewed:    I reviewed the patient's nursing notes, vitals, past medical records, Care Everywhere.     Assessments:    1455 I performed an exam of the patient as documented above.     1540 Patient rechecked and updated.      Consults:   1515 I spoke with Dr. Rodarte, patient's primary care, regarding patient's presentation, findings, and plan of care.     Interventions:  1511 Benadryl 25 mg PO    Disposition:  The patient was discharged to home.     Impression & Plan    Medical Decision Making:  He has evidence of isolated angioedema to his lower lip, very likely secondary to recent initiation of lisinopril for his hypertension in the setting of underlying diabetes for which he is reluctant to take the full range of treatments recommended by his primary clinic.  No evidence of difficulty breathing or swallowing, I do not think this represents anaphylaxis or that epinephrine or further treatments or hospitalization were indicated emergently.  He has a clear voice and no stridor or respiratory trouble whatsoever.  He should return here in unexpected event of sudden worsening at any hour.  The rationale for immediate and permanent discontinuation of lisinopril was reviewed with him and lisinopril was added to his epic chart as a \"allergy\".  After speaking with his primary physician, I initiated amlodipine 5 mg daily and discussed this directly with the patient.  I also urged him to fully comply with his diabetic regimen to optimize control of his diabetes.  Due to his uninsured status, he did not wish for us to check his blood sugar.  Given his current vital signs and history, I do not suspect DKA though would not be surprised if he does still have some degree of hyperglycemia.  He is very happy with this plan and was discharged home after discussion of the above. "     Diagnosis:    ICD-10-CM    1. Angioedema, initial encounter  T78.3XXA    2. Benign essential hypertension  I10    3. Other specified diabetes mellitus with other specified complication, unspecified whether long term insulin use (H)  E13.69      Discharge Medications:  New Prescriptions    AMLODIPINE (NORVASC) 5 MG TABLET    Take 1 tablet (5 mg) by mouth daily for 15 days     Scribe Disclosure:  I, Orla Severson, am serving as a scribe at 2:50 PM on 3/5/2021 to document services personally performed by Kareem Chapin MD based on my observations and the provider's statements to me.     [facility] EMERGENCY DEPT    This note was completed in part using Dragon voice recognition software. Although reviewed after completion, some word and grammatical errors may occur.          Kareem Chapin MD  03/05/21 7834

## 2021-12-23 DIAGNOSIS — E11.65 TYPE 2 DIABETES MELLITUS WITH HYPERGLYCEMIA, WITHOUT LONG-TERM CURRENT USE OF INSULIN (H): ICD-10-CM

## 2021-12-23 NOTE — TELEPHONE ENCOUNTER
Routing refill request to provider for review/approval because:  Patient needs to be seen because:  Due for 6 month check.   LOV was 1/26/2021.

## 2021-12-24 RX ORDER — METFORMIN HCL 500 MG
2000 TABLET, EXTENDED RELEASE 24 HR ORAL
Qty: 120 TABLET | Refills: 0 | Status: SHIPPED | OUTPATIENT
Start: 2021-12-24 | End: 2022-02-14

## 2022-02-11 DIAGNOSIS — I10 HYPERTENSION GOAL BP (BLOOD PRESSURE) < 140/80: Primary | ICD-10-CM

## 2022-02-11 DIAGNOSIS — E11.65 TYPE 2 DIABETES MELLITUS WITH HYPERGLYCEMIA, WITHOUT LONG-TERM CURRENT USE OF INSULIN (H): ICD-10-CM

## 2022-02-11 NOTE — TELEPHONE ENCOUNTER
Pharmacy change:  [facility] [address], [place].       Patient is out of Metformin.    Patient would like this medication filled:   indapamide (LOZOL) 1.25 MG tablet

## 2022-02-14 RX ORDER — METFORMIN HCL 500 MG
2000 TABLET, EXTENDED RELEASE 24 HR ORAL
Qty: 120 TABLET | Refills: 0 | Status: SHIPPED | OUTPATIENT
Start: 2022-02-14 | End: 2022-05-13

## 2022-02-14 RX ORDER — AMLODIPINE BESYLATE 5 MG/1
5 TABLET ORAL DAILY
Qty: 30 TABLET | Refills: 0 | Status: SHIPPED | OUTPATIENT
Start: 2022-02-14 | End: 2022-05-13

## 2022-03-15 DIAGNOSIS — E11.65 TYPE 2 DIABETES MELLITUS WITH HYPERGLYCEMIA, WITHOUT LONG-TERM CURRENT USE OF INSULIN (H): ICD-10-CM

## 2022-03-15 NOTE — LETTER
March 23, 2022    Jaren Carmona  [address]  [place] [address]        Dear Jaren,    While refilling your prescription today, we noticed that you are due to have a IN PERSON visit with your Provider.  That appointment must be made before any additional refills can be approved.     Taking care of your health is important to us and we look forward to seeing you in the near future.  Please call us at [phone] or [phone] (or use Omnikles) to schedule.  Please disregard this notice if you have already made an appointment.        Sincerely,          [facility] Team

## 2022-03-16 RX ORDER — METFORMIN HCL 500 MG
TABLET, EXTENDED RELEASE 24 HR ORAL
Qty: 120 TABLET | Refills: 0 | OUTPATIENT
Start: 2022-03-16

## 2022-03-16 NOTE — TELEPHONE ENCOUNTER
Routing refill request to provider for review/approval because:  Sanjuana given x1 and patient did not follow up, please advise  Nadine Castillo RN

## 2022-03-17 NOTE — TELEPHONE ENCOUNTER
Not seen since 1/21- needs appt scheduled then I will ok the med.  Did this last time but cancelled or no showed-dont do that again as then won't even fill it this way.

## 2022-05-13 ENCOUNTER — VIRTUAL VISIT (OUTPATIENT)
Dept: FAMILY MEDICINE | Facility: CLINIC | Age: 62
End: 2022-05-13

## 2022-05-13 DIAGNOSIS — E11.65 TYPE 2 DIABETES MELLITUS WITH HYPERGLYCEMIA, WITHOUT LONG-TERM CURRENT USE OF INSULIN (H): ICD-10-CM

## 2022-05-13 DIAGNOSIS — E78.5 HYPERLIPIDEMIA LDL GOAL <130: Primary | ICD-10-CM

## 2022-05-13 DIAGNOSIS — E11.22 TYPE 2 DIABETES MELLITUS WITH STAGE 3A CHRONIC KIDNEY DISEASE, WITH LONG-TERM CURRENT USE OF INSULIN (H): ICD-10-CM

## 2022-05-13 DIAGNOSIS — I10 HYPERTENSION GOAL BP (BLOOD PRESSURE) < 140/80: ICD-10-CM

## 2022-05-13 DIAGNOSIS — N18.31 TYPE 2 DIABETES MELLITUS WITH STAGE 3A CHRONIC KIDNEY DISEASE, WITH LONG-TERM CURRENT USE OF INSULIN (H): ICD-10-CM

## 2022-05-13 DIAGNOSIS — Z11.4 SCREENING FOR HIV (HUMAN IMMUNODEFICIENCY VIRUS): ICD-10-CM

## 2022-05-13 DIAGNOSIS — Z79.4 TYPE 2 DIABETES MELLITUS WITH STAGE 3A CHRONIC KIDNEY DISEASE, WITH LONG-TERM CURRENT USE OF INSULIN (H): ICD-10-CM

## 2022-05-13 DIAGNOSIS — Z13.220 SCREENING FOR HYPERLIPIDEMIA: ICD-10-CM

## 2022-05-13 PROCEDURE — 99213 OFFICE O/P EST LOW 20 MIN: CPT | Mod: 95 | Performed by: PHYSICIAN ASSISTANT

## 2022-05-13 RX ORDER — AMLODIPINE BESYLATE 5 MG/1
5 TABLET ORAL DAILY
Qty: 90 TABLET | Refills: 1 | Status: SHIPPED | OUTPATIENT
Start: 2022-05-13 | End: 2022-08-29

## 2022-05-13 RX ORDER — METFORMIN HCL 500 MG
1500 TABLET, EXTENDED RELEASE 24 HR ORAL
Qty: 270 TABLET | Refills: 0 | Status: SHIPPED | OUTPATIENT
Start: 2022-05-13 | End: 2022-08-24

## 2022-05-13 NOTE — PROGRESS NOTES
Jaren is a 61 year old who is being evaluated via a billable video visit.      How would you like to obtain your AVS? Mail a copy  If the video visit is dropped, the invitation should be resent by: Text to cell phone: [phone]  Will anyone else be joining your video visit? No    Video Start Time: 9:22 AM        Subjective   Jaren is a 61 year old who presents for the following health issues     HPI     Diabetes Follow-up      How often are you checking your blood sugar? Not at all    What concerns do you have today about your diabetes? None     Do you have any of these symptoms? (Select all that apply)  No numbness or tingling in feet.  No redness, sores or blisters on feet.  No complaints of excessive thirst.  No reports of blurry vision.  No significant changes to weight.    Have you had a diabetic eye exam in the last 12 months? No    Has been watching his diet fairly closely. Getting some exercise. No vision changes . No neuropathy symptoms. No chest pain/sob/palpitations/dizziness/ha's  Not administering insulin. Taking metformin. Not checking blood sugars. Only taking 1500 mg of metformin daily.     BP Readings from Last 2 Encounters:   03/05/21 (!) 142/96   01/26/21 130/88     LDL Cholesterol Calculated (mg/dL)   Date Value   12/08/2015 154 (H)   02/22/2013 152 (H)     LDL Cholesterol Direct (mg/dL)   Date Value   08/30/2013 143 (H)         Hypertension Follow-up      Do you check your blood pressure regularly outside of the clinic? Yes     Are you following a low salt diet? Yes    Are your blood pressures ever more than 140 on the top number (systolic) OR more   than 90 on the bottom number (diastolic), for example 140/90? Yes          Review of Systems   Constitutional, HEENT, cardiovascular, pulmonary, GI, , musculoskeletal, neuro, skin, endocrine and psych systems are negative, except as otherwise noted.      Objective           Vitals:  No vitals were obtained today due to virtual  visit.    Physical Exam   GENERAL: Healthy, alert and no distress  EYES: Eyes grossly normal to inspection.  No discharge or erythema, or obvious scleral/conjunctival abnormalities.  RESP: No audible wheeze, cough, or visible cyanosis.  No visible retractions or increased work of breathing.    SKIN: Visible skin clear. No significant rash, abnormal pigmentation or lesions.  NEURO: Cranial nerves grossly intact.  Mentation and speech appropriate for age.  PSYCH: Mentation appears normal, affect normal/bright, judgement and insight intact, normal speech and appearance well-groomed.    Jaren was seen today for diabetes and hypertension.    Diagnoses and all orders for this visit:    Hyperlipidemia LDL goal <130    Screening for HIV (human immunodeficiency virus)  -     HIV Antigen Antibody Combo; Future    Screening for hyperlipidemia  -     Lipid panel reflex to direct LDL Fasting; Future    Hypertension goal BP (blood pressure) < 140/80  -     BASIC METABOLIC PANEL; Future  -     amLODIPine (NORVASC) 5 MG tablet; Take 1 tablet (5 mg) by mouth daily    Type 2 diabetes mellitus with stage 3a chronic kidney disease, with long-term current use of insulin (H)  -     HEMOGLOBIN A1C; Future  -     Albumin Random Urine Quantitative with Creat Ratio; Future  -     BASIC METABOLIC PANEL; Future    Type 2 diabetes mellitus with hyperglycemia, without long-term current use of insulin (H)  -     metFORMIN (GLUCOPHAGE XR) 500 MG 24 hr tablet; Take 3 tablets (1,500 mg) by mouth daily (with dinner)    Other orders  -     REVIEW OF HEALTH MAINTENANCE PROTOCOL ORDERS      work on lifestyle modification  Recheck in 3-4 mos        Video-Visit Details    Type of service:  Video Visit    Video End Time:9:35 AM    Originating Location (pt. Location): Home    Distant Location (provider location):  [facility]     Platform used for Video Visit: Bibiana

## 2022-08-29 ENCOUNTER — VIRTUAL VISIT (OUTPATIENT)
Dept: INTERNAL MEDICINE | Facility: CLINIC | Age: 62
End: 2022-08-29

## 2022-08-29 DIAGNOSIS — Z12.5 SCREENING FOR PROSTATE CANCER: ICD-10-CM

## 2022-08-29 DIAGNOSIS — I10 HYPERTENSION GOAL BP (BLOOD PRESSURE) < 140/80: ICD-10-CM

## 2022-08-29 DIAGNOSIS — E11.65 TYPE 2 DIABETES MELLITUS WITH HYPERGLYCEMIA, WITHOUT LONG-TERM CURRENT USE OF INSULIN (H): ICD-10-CM

## 2022-08-29 DIAGNOSIS — Z11.59 NEED FOR HEPATITIS C SCREENING TEST: ICD-10-CM

## 2022-08-29 DIAGNOSIS — R41.89 COGNITIVE CHANGES: Primary | ICD-10-CM

## 2022-08-29 PROCEDURE — 99213 OFFICE O/P EST LOW 20 MIN: CPT | Mod: 95 | Performed by: INTERNAL MEDICINE

## 2022-08-29 RX ORDER — AMLODIPINE BESYLATE 5 MG/1
5 TABLET ORAL DAILY
Qty: 90 TABLET | Refills: 1 | Status: SHIPPED | OUTPATIENT
Start: 2022-08-29 | End: 2022-09-22

## 2022-08-29 RX ORDER — METFORMIN HCL 500 MG
1500 TABLET, EXTENDED RELEASE 24 HR ORAL
Qty: 270 TABLET | Refills: 1 | Status: SHIPPED | OUTPATIENT
Start: 2022-08-29 | End: 2022-09-22

## 2022-08-29 NOTE — PROGRESS NOTES
Jaren is a 62 year old who is being evaluated via a billable video visit.      How would you like to obtain your AVS? Mail a copy  If the video visit is dropped, the invitation should be resent by: Text to cell phone: [phone]  Will anyone else be joining your video visit? No          Assessment & Plan     Hypertension goal BP (blood pressure) < 140/80      Type 2 diabetes mellitus with hyperglycemia, without long-term current use of insulin (H)    - Lipid panel reflex to direct LDL Fasting; Future  - Comprehensive metabolic panel; Future  - Hemoglobin A1c; Future  - Albumin Random Urine Quantitative with Creat Ratio; Future  - TSH with free T4 reflex; Future    Need for hepatitis C screening test      Cognitive changes    - Adult Neurology  Referral; Future    Screening for prostate cancer    - Prostate Specific Antigen Screen; Future                 Return in about 2 months (around 10/29/2022) for Diabetes Check, with pre-visit fasting lab.    Alex Restrepo MD  [facility]   Jaren is a 62 year old, presenting for the following health issues:  Establish Care      HPI     Diabetes Follow-up      How often are you checking your blood sugar? Not at all    What concerns do you have today about your diabetes? None     Do you have any of these symptoms? (Select all that apply)  No numbness or tingling in feet.  No redness, sores or blisters on feet.  No complaints of excessive thirst.  No reports of blurry vision.  No significant changes to weight.    Have you had a diabetic eye exam in the last 12 months? No        BP Readings from Last 2 Encounters:   03/05/21 (!) 142/96   01/26/21 130/88     LDL Cholesterol Calculated (mg/dL)   Date Value   12/08/2015 154 (H)   02/22/2013 152 (H)     LDL Cholesterol Direct (mg/dL)   Date Value   08/30/2013 143 (H)         Hypertension Follow-up      Do you check your blood pressure regularly outside of the clinic? Yes      Are you following a low salt diet? Yes    Are your blood pressures ever more than 140 on the top number (systolic) OR more   than 90 on the bottom number (diastolic), for example 140/90? No    Other concerns:  1. Requesting orders for FIT stool test   2. Requesting orders of MRI of brain and neck- previously ordered by Dr. Fleming but order has . Patient states he need an open MRI and would like this completed at [facility] in [place]             Review of Systems   Constitutional, HEENT, cardiovascular, pulmonary, gi and gu systems are negative, except as otherwise noted.      Objective           Vitals:  No vitals were obtained today due to virtual visit.    Physical Exam   GENERAL: Healthy, alert and no distress  EYES: Eyes grossly normal to inspection.  No discharge or erythema, or obvious scleral/conjunctival abnormalities.  RESP: No audible wheeze, cough, or visible cyanosis.  No visible retractions or increased work of breathing.    SKIN: Visible skin clear. No significant rash, abnormal pigmentation or lesions.  NEURO: Cranial nerves grossly intact.  Mentation and speech appropriate for age.  PSYCH: Mentation appears normal, affect normal/bright, judgement and insight intact, normal speech and appearance well-groomed.                 Video-Visit Details    Video Start Time: 5:30 PM    Type of service:  Video Visit    Video End Time:5:45 PM    Originating Location (pt. Location): Home    Distant Location (provider location):  [facility]     Platform used for Video Visit: Lisseth Treadwell

## 2022-09-22 ENCOUNTER — OFFICE VISIT (OUTPATIENT)
Dept: FAMILY MEDICINE | Facility: CLINIC | Age: 62
End: 2022-09-22

## 2022-09-22 VITALS
HEIGHT: 70 IN | BODY MASS INDEX: 19.18 KG/M2 | HEART RATE: 82 BPM | RESPIRATION RATE: 14 BRPM | OXYGEN SATURATION: 99 % | WEIGHT: 134 LBS | TEMPERATURE: 96.3 F | SYSTOLIC BLOOD PRESSURE: 112 MMHG | DIASTOLIC BLOOD PRESSURE: 78 MMHG

## 2022-09-22 DIAGNOSIS — E78.5 HYPERLIPIDEMIA LDL GOAL <100: ICD-10-CM

## 2022-09-22 DIAGNOSIS — F07.81 POSTCONCUSSION SYNDROME: ICD-10-CM

## 2022-09-22 DIAGNOSIS — R63.4 WEIGHT LOSS: ICD-10-CM

## 2022-09-22 DIAGNOSIS — I10 HYPERTENSION GOAL BP (BLOOD PRESSURE) < 140/80: ICD-10-CM

## 2022-09-22 DIAGNOSIS — Z11.59 NEED FOR HEPATITIS C SCREENING TEST: ICD-10-CM

## 2022-09-22 DIAGNOSIS — E11.65 TYPE 2 DIABETES MELLITUS WITH HYPERGLYCEMIA, WITHOUT LONG-TERM CURRENT USE OF INSULIN (H): Primary | ICD-10-CM

## 2022-09-22 DIAGNOSIS — Z12.5 SCREENING FOR PROSTATE CANCER: ICD-10-CM

## 2022-09-22 LAB
ALBUMIN UR-MCNC: NEGATIVE MG/DL
APPEARANCE UR: CLEAR
BASOPHILS # BLD AUTO: 0 10E3/UL (ref 0–0.2)
BASOPHILS NFR BLD AUTO: 1 %
BILIRUB UR QL STRIP: NEGATIVE
COLOR UR AUTO: YELLOW
EOSINOPHIL # BLD AUTO: 0.3 10E3/UL (ref 0–0.7)
EOSINOPHIL NFR BLD AUTO: 5 %
ERYTHROCYTE [DISTWIDTH] IN BLOOD BY AUTOMATED COUNT: 12.2 % (ref 10–15)
GLUCOSE UR STRIP-MCNC: >=1000 MG/DL
HBA1C MFR BLD: >15 % (ref 0–5.6)
HCT VFR BLD AUTO: 41.5 % (ref 40–53)
HGB BLD-MCNC: 13.6 G/DL (ref 13.3–17.7)
HGB UR QL STRIP: NEGATIVE
IMM GRANULOCYTES # BLD: 0 10E3/UL
IMM GRANULOCYTES NFR BLD: 0 %
KETONES UR STRIP-MCNC: NEGATIVE MG/DL
LEUKOCYTE ESTERASE UR QL STRIP: NEGATIVE
LYMPHOCYTES # BLD AUTO: 2.8 10E3/UL (ref 0.8–5.3)
LYMPHOCYTES NFR BLD AUTO: 54 %
MCH RBC QN AUTO: 30 PG (ref 26.5–33)
MCHC RBC AUTO-ENTMCNC: 32.8 G/DL (ref 31.5–36.5)
MCV RBC AUTO: 91 FL (ref 78–100)
MONOCYTES # BLD AUTO: 0.4 10E3/UL (ref 0–1.3)
MONOCYTES NFR BLD AUTO: 8 %
NEUTROPHILS # BLD AUTO: 1.7 10E3/UL (ref 1.6–8.3)
NEUTROPHILS NFR BLD AUTO: 32 %
NITRATE UR QL: NEGATIVE
NRBC # BLD AUTO: 0 10E3/UL
NRBC BLD AUTO-RTO: 0 /100
PH UR STRIP: 5.5 [PH] (ref 5–7)
PLATELET # BLD AUTO: 229 10E3/UL (ref 150–450)
RBC # BLD AUTO: 4.54 10E6/UL (ref 4.4–5.9)
SP GR UR STRIP: 1.02 (ref 1–1.03)
UROBILINOGEN UR STRIP-ACNC: 0.2 E.U./DL
WBC # BLD AUTO: 5.1 10E3/UL (ref 4–11)

## 2022-09-22 PROCEDURE — G0103 PSA SCREENING: HCPCS | Performed by: PHYSICIAN ASSISTANT

## 2022-09-22 PROCEDURE — 83036 HEMOGLOBIN GLYCOSYLATED A1C: CPT | Performed by: PHYSICIAN ASSISTANT

## 2022-09-22 PROCEDURE — 80061 LIPID PANEL: CPT | Performed by: PHYSICIAN ASSISTANT

## 2022-09-22 PROCEDURE — 36415 COLL VENOUS BLD VENIPUNCTURE: CPT | Performed by: PHYSICIAN ASSISTANT

## 2022-09-22 PROCEDURE — 81003 URINALYSIS AUTO W/O SCOPE: CPT | Performed by: PHYSICIAN ASSISTANT

## 2022-09-22 PROCEDURE — 86803 HEPATITIS C AB TEST: CPT | Performed by: PHYSICIAN ASSISTANT

## 2022-09-22 PROCEDURE — 99214 OFFICE O/P EST MOD 30 MIN: CPT | Performed by: PHYSICIAN ASSISTANT

## 2022-09-22 PROCEDURE — 80050 GENERAL HEALTH PANEL: CPT | Performed by: PHYSICIAN ASSISTANT

## 2022-09-22 RX ORDER — GLIPIZIDE 10 MG/1
10 TABLET, FILM COATED, EXTENDED RELEASE ORAL DAILY
Qty: 90 TABLET | Refills: 0 | Status: SHIPPED | OUTPATIENT
Start: 2022-09-22 | End: 2023-10-24

## 2022-09-22 RX ORDER — AMLODIPINE BESYLATE 5 MG/1
5 TABLET ORAL DAILY
Qty: 90 TABLET | Refills: 1 | Status: SHIPPED | OUTPATIENT
Start: 2022-09-22 | End: 2023-06-26

## 2022-09-22 RX ORDER — METFORMIN HCL 500 MG
2000 TABLET, EXTENDED RELEASE 24 HR ORAL
Qty: 360 TABLET | Refills: 1 | Status: SHIPPED | OUTPATIENT
Start: 2022-09-22 | End: 2023-08-01

## 2022-09-22 ASSESSMENT — PAIN SCALES - GENERAL: PAINLEVEL: SEVERE PAIN (6)
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
## 2022-09-22 NOTE — PATIENT INSTRUCTIONS
Start checking blood sugars twice a day. Every morning and then one other time (prior to lunch, supper or at bedtime).      Blood sugar goals:  Prior to meals:    At bedtime:  110-150

## 2022-09-23 ENCOUNTER — TELEPHONE (OUTPATIENT)
Dept: FAMILY MEDICINE | Facility: CLINIC | Age: 62
End: 2022-09-23

## 2022-09-23 LAB
ALBUMIN SERPL-MCNC: 3.8 G/DL (ref 3.4–5)
ALP SERPL-CCNC: 99 U/L (ref 40–150)
ALT SERPL W P-5'-P-CCNC: 30 U/L (ref 0–70)
ANION GAP SERPL CALCULATED.3IONS-SCNC: 6 MMOL/L (ref 3–14)
AST SERPL W P-5'-P-CCNC: 13 U/L (ref 0–45)
BILIRUB SERPL-MCNC: 0.4 MG/DL (ref 0.2–1.3)
BUN SERPL-MCNC: 10 MG/DL (ref 7–30)
CALCIUM SERPL-MCNC: 9.7 MG/DL (ref 8.5–10.1)
CHLORIDE BLD-SCNC: 101 MMOL/L (ref 94–109)
CHOLEST SERPL-MCNC: 160 MG/DL
CO2 SERPL-SCNC: 28 MMOL/L (ref 20–32)
CREAT SERPL-MCNC: 0.78 MG/DL (ref 0.66–1.25)
FASTING STATUS PATIENT QL REPORTED: NORMAL
GFR SERPL CREATININE-BSD FRML MDRD: >90 ML/MIN/1.73M2
GLUCOSE BLD-MCNC: 357 MG/DL (ref 70–99)
HCV AB SERPL QL IA: NONREACTIVE
HDLC SERPL-MCNC: 62 MG/DL
LDLC SERPL CALC-MCNC: 84 MG/DL
NONHDLC SERPL-MCNC: 98 MG/DL
POTASSIUM BLD-SCNC: 4.4 MMOL/L (ref 3.4–5.3)
PROT SERPL-MCNC: 7.3 G/DL (ref 6.8–8.8)
PSA SERPL-MCNC: 1.03 UG/L (ref 0–4)
SODIUM SERPL-SCNC: 135 MMOL/L (ref 133–144)
TRIGL SERPL-MCNC: 68 MG/DL
TSH SERPL DL<=0.005 MIU/L-ACNC: 0.66 MU/L (ref 0.4–4)

## 2022-09-23 NOTE — TELEPHONE ENCOUNTER
Diabetes Education Scheduling Outreach #1:    Call to patient to schedule. Left message with phone number to call to schedule.    Charlotte Fitzpatrick  [facility]  Diabetes and Nutrition Scheduling

## 2023-01-06 ENCOUNTER — TELEPHONE (OUTPATIENT)
Dept: FAMILY MEDICINE | Facility: CLINIC | Age: 63
End: 2023-01-06

## 2023-01-06 NOTE — TELEPHONE ENCOUNTER
Patient Quality Outreach    Patient is due for the following:   Diabetes -  A1C, Eye Exam and Foot Exam  Physical Preventive Adult Physical      Topic Date Due     Pneumococcal Vaccine (1 - PCV) Never done     Zoster (Shingles) Vaccine (1 of 2) Never done     COVID-19 Vaccine (3 - Booster for Pfizer series) 08/04/2021     Flu Vaccine (1) Never done       Next Steps:   Schedule a Adult Preventative with diabetes recheck    Type of outreach:    Sent letter.      Questions for provider review:    None     Tari Pastrana

## 2023-02-14 ENCOUNTER — TELEPHONE (OUTPATIENT)
Dept: FAMILY MEDICINE | Facility: CLINIC | Age: 63
End: 2023-02-14

## 2023-02-14 NOTE — LETTER
February 14, 2023      Jaren Dukes Mathew  [address]  [facility] [address]      Your healthcare team cares about your health. To provide you with the best care, we have reviewed your chart and based on our findings, we see that you are due to:     Schedule a DIABETIC FOLLOW UP appointment for Office Visit. Patients with diabetes should see their provider regularly.  Schedule a DIABETIC EYE EXAM.  This exam is done with an optometrist, annually. You can schedule this appointment with your eye doctor.  If you need a referral, please let us know.  PREVENTATIVE VISIT: Physical    If you have already completed these items, please contact the clinic via phone or Mychart so your care team can review and update your records.  Thank you for choosing [facility] for your healthcare needs. For any questions, concerns, or to schedule an appointment please contact the clinic.       Healthy Regards,      Your [facility] Care Team

## 2023-02-14 NOTE — TELEPHONE ENCOUNTER
Patient Quality Outreach    Patient is due for the following:   Diabetes -  A1C, Eye Exam, Microalbumin, Diabetic Follow-Up Visit and Foot Exam  Physical Preventive Adult Physical      Topic Date Due     Pneumococcal Vaccine (1 - PCV) Never done     Zoster (Shingles) Vaccine (1 of 2) Never done     COVID-19 Vaccine (3 - Booster for Pfizer series) 08/04/2021     Flu Vaccine (1) Never done       Next Steps:   Schedule a Adult Preventative with diabetes recheck    Type of outreach:    Sent letter.      Questions for provider review:    None     Tari Pastrana

## 2023-10-24 ENCOUNTER — OFFICE VISIT (OUTPATIENT)
Dept: FAMILY MEDICINE | Facility: CLINIC | Age: 63
End: 2023-10-24

## 2023-10-24 VITALS
SYSTOLIC BLOOD PRESSURE: 132 MMHG | BODY MASS INDEX: 19.21 KG/M2 | WEIGHT: 134.2 LBS | OXYGEN SATURATION: 97 % | TEMPERATURE: 97.1 F | DIASTOLIC BLOOD PRESSURE: 89 MMHG | HEART RATE: 97 BPM | RESPIRATION RATE: 20 BRPM | HEIGHT: 70 IN

## 2023-10-24 DIAGNOSIS — N52.8 OTHER MALE ERECTILE DYSFUNCTION: ICD-10-CM

## 2023-10-24 DIAGNOSIS — Z12.5 SCREENING FOR PROSTATE CANCER: ICD-10-CM

## 2023-10-24 DIAGNOSIS — E11.65 TYPE 2 DIABETES MELLITUS WITH HYPERGLYCEMIA, WITHOUT LONG-TERM CURRENT USE OF INSULIN (H): Primary | ICD-10-CM

## 2023-10-24 DIAGNOSIS — E78.5 HYPERLIPIDEMIA LDL GOAL <130: ICD-10-CM

## 2023-10-24 DIAGNOSIS — I10 HYPERTENSION GOAL BP (BLOOD PRESSURE) < 140/80: ICD-10-CM

## 2023-10-24 PROBLEM — E11.9 DIABETES MELLITUS, TYPE 2 (H): Status: ACTIVE | Noted: 2023-10-24

## 2023-10-24 LAB — HBA1C MFR BLD: >15 % (ref 0–5.6)

## 2023-10-24 PROCEDURE — G0103 PSA SCREENING: HCPCS | Performed by: PHYSICIAN ASSISTANT

## 2023-10-24 PROCEDURE — 82043 UR ALBUMIN QUANTITATIVE: CPT | Performed by: PHYSICIAN ASSISTANT

## 2023-10-24 PROCEDURE — 80061 LIPID PANEL: CPT | Performed by: PHYSICIAN ASSISTANT

## 2023-10-24 PROCEDURE — 82570 ASSAY OF URINE CREATININE: CPT | Performed by: PHYSICIAN ASSISTANT

## 2023-10-24 PROCEDURE — 83036 HEMOGLOBIN GLYCOSYLATED A1C: CPT | Performed by: PHYSICIAN ASSISTANT

## 2023-10-24 PROCEDURE — 99207 PR FOOT EXAM NO CHARGE: CPT | Performed by: PHYSICIAN ASSISTANT

## 2023-10-24 PROCEDURE — 80048 BASIC METABOLIC PNL TOTAL CA: CPT | Performed by: PHYSICIAN ASSISTANT

## 2023-10-24 PROCEDURE — 99214 OFFICE O/P EST MOD 30 MIN: CPT | Performed by: PHYSICIAN ASSISTANT

## 2023-10-24 PROCEDURE — 36415 COLL VENOUS BLD VENIPUNCTURE: CPT | Performed by: PHYSICIAN ASSISTANT

## 2023-10-24 RX ORDER — GLIPIZIDE 10 MG/1
20 TABLET, FILM COATED, EXTENDED RELEASE ORAL DAILY
Qty: 180 TABLET | Refills: 0 | Status: SHIPPED | OUTPATIENT
Start: 2023-10-24

## 2023-10-24 RX ORDER — METFORMIN HCL 500 MG
2000 TABLET, EXTENDED RELEASE 24 HR ORAL
Qty: 360 TABLET | Refills: 0 | Status: SHIPPED | OUTPATIENT
Start: 2023-10-24 | End: 2024-02-22

## 2023-10-24 RX ORDER — SILDENAFIL CITRATE 20 MG/1
TABLET ORAL
Qty: 30 TABLET | Refills: 11 | Status: SHIPPED | OUTPATIENT
Start: 2023-10-24

## 2023-10-24 ASSESSMENT — PAIN SCALES - GENERAL: PAINLEVEL: NO PAIN (0)

## 2023-10-24 NOTE — PROGRESS NOTES
"    Kailey Eastman is a 63 year old, presenting for the following health issues:  Diabetes and Health Maintenance (Patient declined vaccines (tdap, influenza, COVID, RSV, shingles, pneumonia))        10/24/2023     3:46 PM   Additional Questions   Roomed by Tari Pastrana CMA   Accompanied by None         10/24/2023     3:46 PM   Patient Reported Additional Medications   Patient reports taking the following new medications none       History of Present Illness       Diabetes:   He presents for follow up of diabetes.    He is not checking blood glucose.         He has no concerns regarding his diabetes at this time.  He is having weight loss.  The patient has not had a diabetic eye exam in the last 12 months.          Hypertension: He presents for follow up of hypertension.  He does not check blood pressure  regularly outside of the clinic. Outside blood pressures have been over 140/90. He follows a low salt diet.     He eats 4 or more servings of fruits and vegetables daily.He consumes 2 sweetened beverage(s) daily.He exercises with enough effort to increase his heart rate 10 to 19 minutes per day.  He exercises with enough effort to increase his heart rate 3 or less days per week.   He is taking medications regularly.     Amlodipine caused cramping issues. Working on lifestyle changes and his home blood pressure numbers have been with in normal range.  Some ED issues.   Some polyuria. Denies polydipsia.  No chest pain/sob/palpitations/dizziness/ha's    Non compliant with meds and diet, though he feels he is consuming a healthy diet.       Review of Systems   Constitutional, HEENT, cardiovascular, pulmonary, GI, , musculoskeletal, neuro, skin, endocrine and psych systems are negative, except as otherwise noted.      Objective    /89   Pulse 97   Temp 97.1  F (36.2  C) (Temporal)   Resp 20   Ht 1.778 m (5' 10\")   Wt 60.9 kg (134 lb 3.2 oz)   SpO2 97%   BMI 19.26 kg/m    Body mass index is 19.26 " kg/m .  Physical Exam   Eye exam - right eye normal lid, conjunctiva, cornea, pupil and fundus, left eye normal lid, conjunctiva, cornea, pupil and fundus.  Thyroid not palpable, not enlarged, no nodules detected.  CHEST:chest clear to IPPA, no tachypnea, retractions or cyanosis, and S1, S2 normal, no murmur, no gallop, rate regular.  Foot exam - both sides normal; no swelling, tenderness or skin or vascular lesions. Color and temperature is normal. Sensation is intact. Peripheral pulses are palpable. Toenails are normal.    Jaren was seen today for diabetes and health maintenance.    Diagnoses and all orders for this visit:    Type 2 diabetes mellitus with hyperglycemia, without long-term current use of insulin (H)  -     Albumin Random Urine Quantitative with Creat Ratio; Future  -     Adult Eye  Referral; Future  -     HEMOGLOBIN A1C; Future  -     FOOT EXAM  -     Albumin Random Urine Quantitative with Creat Ratio  -     HEMOGLOBIN A1C  -     [facility] Adult Diabetes Educator Referral; Future  -     Adult Endocrinology  Referral; Future  -     glipiZIDE (GLUCOTROL XL) 10 MG 24 hr tablet; Take 2 tablets (20 mg) by mouth daily  -     metFORMIN (GLUCOPHAGE XR) 500 MG 24 hr tablet; Take 4 tablets (2,000 mg) by mouth daily (with dinner)    Hypertension goal BP (blood pressure) < 140/80  -     BASIC METABOLIC PANEL; Future  -     BASIC METABOLIC PANEL    Hyperlipidemia LDL goal <130  -     Cancel: Lipid panel reflex to direct LDL Non-fasting; Future  -     Lipid panel reflex to direct LDL Non-fasting    Screening for prostate cancer  -     PSA, screen; Future    Other male erectile dysfunction  -     sildenafil (REVATIO) 20 MG tablet; Take 2-5 tab 1/2-4 hours prior to relations.    Other orders  -     Cancel: Pneumococcal 20 Valent Conjugate (Prevnar 20)  -     REVIEW OF HEALTH MAINTENANCE PROTOCOL ORDERS  -     Cancel: TDAP 10-64Y (ADACEL,BOOSTRIX)  -     Cancel: COVID-19 12+ (2023-24) (PFIZER)  -      Cancel: INFLUENZA VACCINE 18-64Y (FLUBLOK)      Patient refuses antihypertensive medication at this time.   Restart glipizide and really work on a lower sugar/starch diet and get more exercise, until he see's endocrinology.   Recheck in 3 mos

## 2023-10-25 LAB
ANION GAP SERPL CALCULATED.3IONS-SCNC: 12 MMOL/L (ref 7–15)
BUN SERPL-MCNC: 14.8 MG/DL (ref 8–23)
CALCIUM SERPL-MCNC: 9.8 MG/DL (ref 8.8–10.2)
CHLORIDE SERPL-SCNC: 95 MMOL/L (ref 98–107)
CHOLEST SERPL-MCNC: 198 MG/DL
CREAT SERPL-MCNC: 0.82 MG/DL (ref 0.67–1.17)
CREAT UR-MCNC: 30.3 MG/DL
DEPRECATED HCO3 PLAS-SCNC: 25 MMOL/L (ref 22–29)
EGFRCR SERPLBLD CKD-EPI 2021: >90 ML/MIN/1.73M2
GLUCOSE SERPL-MCNC: 543 MG/DL (ref 70–99)
HDLC SERPL-MCNC: 60 MG/DL
LDLC SERPL CALC-MCNC: 115 MG/DL
MICROALBUMIN UR-MCNC: 18.5 MG/L
MICROALBUMIN/CREAT UR: 61.06 MG/G CR (ref 0–17)
NONHDLC SERPL-MCNC: 138 MG/DL
POTASSIUM SERPL-SCNC: 4.8 MMOL/L (ref 3.4–5.3)
PSA SERPL DL<=0.01 NG/ML-MCNC: 0.36 NG/ML (ref 0–4.5)
SODIUM SERPL-SCNC: 132 MMOL/L (ref 135–145)
TRIGL SERPL-MCNC: 114 MG/DL

## 2023-12-20 ENCOUNTER — TELEPHONE (OUTPATIENT)
Dept: FAMILY MEDICINE | Facility: CLINIC | Age: 63
End: 2023-12-20

## 2023-12-20 NOTE — TELEPHONE ENCOUNTER
Pt called requesting last HGB A1C lab results. Pt informed. Pt denies any further questions during call.

## 2024-06-13 ENCOUNTER — TELEPHONE (OUTPATIENT)
Dept: INTERNAL MEDICINE | Facility: CLINIC | Age: 64
End: 2024-06-13

## 2024-06-13 DIAGNOSIS — I10 HYPERTENSION GOAL BP (BLOOD PRESSURE) < 140/80: Primary | ICD-10-CM

## 2024-06-13 NOTE — TELEPHONE ENCOUNTER
Pt called the clinic requesting a refill for amlodipine 5mg tab.     Per chart review- medication has been discontinued on 10/24/23.     Routing to PCP to review and advise.       Preferred pharmacy- [facility] in [facility].

## 2024-06-17 RX ORDER — AMLODIPINE BESYLATE 5 MG/1
5 TABLET ORAL DAILY
Qty: 30 TABLET | Refills: 0 | Status: SHIPPED | OUTPATIENT
Start: 2024-06-17 | End: 2024-07-23

## 2024-06-17 NOTE — TELEPHONE ENCOUNTER
Future Appointments 6/17/2024 - 12/14/2024        Date Visit Type Length Department Provider     6/26/2024  3:00 PM OFFICE VISIT 20 min [facility] Gregory Crocker PA-C    Location Instructions:     [facility] is located at [id number] [place] [address], one [address] east of [address]  and just north of the [place]. Access the parking lot by turning east from [address] onto [address] [facility], then turning north on [address].                   Patient requesting Appointment Reminder be sent via E-Mail to: [email or web address]     Mandy VASQUEZ  [facility]

## 2024-06-25 DIAGNOSIS — E11.65 TYPE 2 DIABETES MELLITUS WITH HYPERGLYCEMIA, WITHOUT LONG-TERM CURRENT USE OF INSULIN (H): ICD-10-CM

## 2024-06-26 RX ORDER — METFORMIN HCL 500 MG
2000 TABLET, EXTENDED RELEASE 24 HR ORAL
Qty: 120 TABLET | Refills: 0 | Status: SHIPPED | OUTPATIENT
Start: 2024-06-26 | End: 2024-09-24

## 2024-07-29 ENCOUNTER — TELEPHONE (OUTPATIENT)
Dept: FAMILY MEDICINE | Facility: CLINIC | Age: 64
End: 2024-07-29

## 2024-07-29 NOTE — TELEPHONE ENCOUNTER
Patient Quality Outreach    Patient is due for the following:   Diabetes -  A1C and Eye Exam  Physical Preventive Adult Physical    Next Steps:   Patient has upcoming appointment, these items will be addressed at that time.    Type of outreach:    Chart review performed, no outreach needed.      Questions for provider review:    None           Tari Pastrana

## 2024-12-31 DIAGNOSIS — E11.65 TYPE 2 DIABETES MELLITUS WITH HYPERGLYCEMIA, WITHOUT LONG-TERM CURRENT USE OF INSULIN (H): ICD-10-CM

## 2024-12-31 RX ORDER — METFORMIN HYDROCHLORIDE 500 MG/1
2000 TABLET, EXTENDED RELEASE ORAL
Qty: 120 TABLET | Refills: 0 | Status: SHIPPED | OUTPATIENT
Start: 2024-12-31

## 2025-01-09 ENCOUNTER — OFFICE VISIT (OUTPATIENT)
Dept: FAMILY MEDICINE | Facility: CLINIC | Age: 65
End: 2025-01-09

## 2025-01-09 VITALS
HEIGHT: 71 IN | TEMPERATURE: 98.7 F | SYSTOLIC BLOOD PRESSURE: 118 MMHG | BODY MASS INDEX: 18.84 KG/M2 | WEIGHT: 134.6 LBS | RESPIRATION RATE: 16 BRPM | HEART RATE: 95 BPM | DIASTOLIC BLOOD PRESSURE: 83 MMHG | OXYGEN SATURATION: 98 %

## 2025-01-09 DIAGNOSIS — Z12.5 SCREENING FOR PROSTATE CANCER: ICD-10-CM

## 2025-01-09 DIAGNOSIS — E11.65 TYPE 2 DIABETES MELLITUS WITH HYPERGLYCEMIA, WITHOUT LONG-TERM CURRENT USE OF INSULIN (H): Primary | ICD-10-CM

## 2025-01-09 DIAGNOSIS — R01.1 HEART MURMUR, SYSTOLIC: ICD-10-CM

## 2025-01-09 DIAGNOSIS — R73.9 HYPERGLYCEMIA: ICD-10-CM

## 2025-01-09 DIAGNOSIS — I10 HYPERTENSION GOAL BP (BLOOD PRESSURE) < 140/80: ICD-10-CM

## 2025-01-09 LAB
EST. AVERAGE GLUCOSE BLD GHB EST-MCNC: >384 MG/DL
HBA1C MFR BLD: >15 % (ref 0–5.6)

## 2025-01-09 RX ORDER — METFORMIN HYDROCHLORIDE 500 MG/1
2000 TABLET, EXTENDED RELEASE ORAL
Qty: 360 TABLET | Refills: 0 | Status: SHIPPED | OUTPATIENT
Start: 2025-01-09

## 2025-01-09 RX ORDER — AMLODIPINE BESYLATE 5 MG/1
5 TABLET ORAL DAILY
Qty: 90 TABLET | Refills: 1 | Status: SHIPPED | OUTPATIENT
Start: 2025-01-09

## 2025-01-09 NOTE — PROGRESS NOTES
"    Kailey Eastman is a 64 year old, presenting for the following health issues:  Diabetes (recheck) and Health Maintenance (Patient declines vaccines/Patient is not fasting/)        1/9/2025     1:55 PM   Additional Questions   Roomed by Tari Pastrana CMA   Accompanied by None         1/9/2025     1:55 PM   Patient Reported Additional Medications   Patient reports taking the following new medications none     History of Present Illness       Diabetes:   He presents for follow up of diabetes.    He is not checking blood glucose.        He is concerned about other.   He is having blurry vision.  The patient has not had a diabetic eye exam in the last 12 months.          He eats 0-1 servings of fruits and vegetables daily.He consumes 0 sweetened beverage(s) daily.He exercises with enough effort to increase his heart rate 9 or less minutes per day.  He exercises with enough effort to increase his heart rate 3 or less days per week.   He is taking medications regularly.     Some vision changes when sugars are higher.  Has only been taking his metformin and blood pressure meds.   Eats a lower sugar diet.  Started exercising this week (exercise bike)  No neuropathy symptoms.   No chest pain/sob/palpitations/dizziness/ha's      Review of Systems  Constitutional, HEENT, cardiovascular, pulmonary, GI, , musculoskeletal, neuro, skin, endocrine and psych systems are negative, except as otherwise noted.      Objective    /83   Pulse 95   Temp 98.7  F (37.1  C) (Oral)   Resp 16   Ht 1.791 m (5' 10.5\")   Wt 61.1 kg (134 lb 9.6 oz)   SpO2 98%   BMI 19.04 kg/m    Body mass index is 19.04 kg/m .  Physical Exam     Diabetic foot exam: normal DP and PT pulses, no trophic changes or ulcerative lesions, normal sensory exam, and normal monofilament exam   Eye exam - right eye normal lid, conjunctiva, cornea, pupil and fundus, left eye normal lid, conjunctiva, cornea, pupil and fundus.  Thyroid not palpable, not " enlarged, no nodules detected.  CHEST:chest clear to IPPA, no tachypnea, retractions or cyanosis, and S1, S2 normal, 2/6 systolic  murmur, no gallop, rate regular.  No edema.    Kamar was seen today for diabetes and health maintenance.    Diagnoses and all orders for this visit:    Type 2 diabetes mellitus with hyperglycemia, without long-term current use of insulin (H)  -     HEMOGLOBIN A1C; Future  -     Albumin Random Urine Quantitative with Creat Ratio; Future  -     OPTOMETRY REFERRAL; Future  -     FOOT EXAM  -     Glutamic acid decarboxylase antibody; Future  -     C-peptide; Future  -     HEMOGLOBIN A1C  -     Albumin Random Urine Quantitative with Creat Ratio  -     Glutamic acid decarboxylase antibody  -     C-peptide  -     insulin glargine (LANTUS PEN) 100 UNIT/ML pen; Inject 15 Units subcutaneously at bedtime.  -     Continuous Glucose Sensor (FREESTYLE ZUNILDA 2 SENSOR) MISC; Change every 14 days.  -     Continuous Glucose  (FREESTYLE ZUNILDA 2 READER) LORETO; Use to read blood sugars as per 's instructions.  -     insulin aspart (NOVOLOG PEN) 100 UNIT/ML pen; Inject 5 Units subcutaneously 3 times daily (with meals).  -     Adult Endocrinology  Referral; Future  -     Adult Diabetes Education  Referral; Future  -     metFORMIN (GLUCOPHAGE XR) 500 MG 24 hr tablet; Take 4 tablets (2,000 mg) by mouth daily (with dinner).    Hypertension goal BP (blood pressure) < 140/80  -     BASIC METABOLIC PANEL; Future  -     BASIC METABOLIC PANEL  -     amLODIPine (NORVASC) 5 MG tablet; Take 1 tablet (5 mg) by mouth daily.    Screening for prostate cancer    Hyperglycemia  -     TSH with free T4 reflex; Future  -     TSH with free T4 reflex  -     Adult Diabetes Education  Referral; Future    Heart murmur, systolic  -     Echocardiogram Complete; Future    Other orders  -     REVIEW OF HEALTH MAINTENANCE PROTOCOL ORDERS      I suspect type 1 or 1.5 dm for kamar. Will start him  on insulin and refer to endo for more specific management.   Signed Electronically by: Gregory Crocker PA-C

## 2025-01-11 LAB — GAD65 AB SER IA-ACNC: >250 IU/ML
